# Patient Record
Sex: FEMALE
[De-identification: names, ages, dates, MRNs, and addresses within clinical notes are randomized per-mention and may not be internally consistent; named-entity substitution may affect disease eponyms.]

---

## 2017-08-12 ENCOUNTER — HOSPITAL ENCOUNTER (OUTPATIENT)
Dept: HOSPITAL 42 - ED | Age: 52
Setting detail: OBSERVATION
LOS: 1 days | Discharge: HOME | End: 2017-08-13
Attending: INTERNAL MEDICINE | Admitting: INTERNAL MEDICINE
Payer: COMMERCIAL

## 2017-08-12 VITALS — BODY MASS INDEX: 35.5 KG/M2

## 2017-08-12 DIAGNOSIS — E78.5: ICD-10-CM

## 2017-08-12 DIAGNOSIS — Z91.11: ICD-10-CM

## 2017-08-12 DIAGNOSIS — D72.829: ICD-10-CM

## 2017-08-12 DIAGNOSIS — E78.2: ICD-10-CM

## 2017-08-12 DIAGNOSIS — R31.29: ICD-10-CM

## 2017-08-12 DIAGNOSIS — I10: ICD-10-CM

## 2017-08-12 DIAGNOSIS — R07.89: Primary | ICD-10-CM

## 2017-08-12 DIAGNOSIS — Z79.4: ICD-10-CM

## 2017-08-12 DIAGNOSIS — E11.9: ICD-10-CM

## 2017-08-12 DIAGNOSIS — E66.01: ICD-10-CM

## 2017-08-12 PROCEDURE — 83615 LACTATE (LD) (LDH) ENZYME: CPT

## 2017-08-12 PROCEDURE — 85027 COMPLETE CBC AUTOMATED: CPT

## 2017-08-12 PROCEDURE — 82728 ASSAY OF FERRITIN: CPT

## 2017-08-12 PROCEDURE — 80053 COMPREHEN METABOLIC PANEL: CPT

## 2017-08-12 PROCEDURE — 82550 ASSAY OF CK (CPK): CPT

## 2017-08-12 PROCEDURE — 83540 ASSAY OF IRON: CPT

## 2017-08-12 PROCEDURE — 84484 ASSAY OF TROPONIN QUANT: CPT

## 2017-08-12 PROCEDURE — 82607 VITAMIN B-12: CPT

## 2017-08-12 PROCEDURE — 81001 URINALYSIS AUTO W/SCOPE: CPT

## 2017-08-12 PROCEDURE — 93005 ELECTROCARDIOGRAM TRACING: CPT

## 2017-08-12 PROCEDURE — 82746 ASSAY OF FOLIC ACID SERUM: CPT

## 2017-08-12 PROCEDURE — 82948 REAGENT STRIP/BLOOD GLUCOSE: CPT

## 2017-08-12 PROCEDURE — 71010: CPT

## 2017-08-12 PROCEDURE — 84439 ASSAY OF FREE THYROXINE: CPT

## 2017-08-12 PROCEDURE — 84443 ASSAY THYROID STIM HORMONE: CPT

## 2017-08-12 PROCEDURE — 99285 EMERGENCY DEPT VISIT HI MDM: CPT

## 2017-08-12 PROCEDURE — 80061 LIPID PANEL: CPT

## 2017-08-12 PROCEDURE — 83036 HEMOGLOBIN GLYCOSYLATED A1C: CPT

## 2017-08-12 PROCEDURE — 83550 IRON BINDING TEST: CPT

## 2017-08-12 PROCEDURE — 93970 EXTREMITY STUDY: CPT

## 2017-08-12 NOTE — ED PDOC
Arrival/HPI





<JohannDav - Last Filed: 08/13/17 01:56>





- History of Present Illness


Time/Duration: 1 hour


Symptom Onset: Sudden


Symptom Course: Improving


Context: Sitting





<KandyJennifer - Last Filed: 08/13/17 02:06>





- General


Chief Complaint: Anxiety


Time Seen by Provider: 08/12/17 22:35





- History of Present Illness


Narrative History of Present Illness (Text): 





08/12/17 23:19


50 y/o F with PMHx of HTN, HLD presents for chest pressure that began about 1.5 

hrs prior to coming to ED. Patient states that she was sitting at home when all 

of a sudden she developed a "sensation" in her epigastric region radiating to 

her chest.  Patient now c/o of slight chest pressure. Denies having any F/C, N/V

/D/C, abd pain, cough.  Patient also states that she occasionally has LE 

swelling L>R.  Patient called EMS and was told to take 4 baby Aspirins. No 

recent travels or history of blood clots.  Patient denies having any tobacco, 

ETOH or drug use.  Patient's father had CAD.    (Jennifer Gaitan)





Past Medical History





- Provider Review


Nursing Documentation Reviewed: Yes





- Travel History


Have you recently traveled outside [place] w/in the past 3 mons?: No





- Cardiac


Hx Hypertension: Yes





- Musculoskeletal/Rheumatological


Hx Falls: No





- Psychiatric


Hx Depression: No


Hx Emotional Abuse: No


Hx Physical Abuse: No


Hx Substance Use: No





- Past Surgical History


Past Surgical History: No Previous





- Suicidal Assessment


Feels Threatened In Home Enviroment: No





<Jennifer Gaitan - Last Filed: 08/13/17 02:06>





Family/Social History





- Physician Review


Nursing Documentation Reviewed: Yes


Family/Social History: CAD/MI


Smoking Status: Never Smoked


Hx Alcohol Use: No


Hx Substance Use: No





<Jennifer Gaitan - Last Filed: 08/13/17 02:06>





Allergies/Home Meds





<JohannDav - Last Filed: 08/13/17 01:56>





<Jennifer Gaitan - Last Filed: 08/13/17 02:06>


Allergies/Adverse Reactions: 


Allergies





No Known Allergies Allergy (Verified 06/09/13 20:34)


 








Home Medications: 


 Home Meds











 Medication  Instructions  Recorded  Confirmed


 


Losartan/Hydrochlorothiazide 1 tab PO DAILY 04/22/16 04/22/16





[Hyzaar 100-25 Tablet]   














Review of Systems





- Review of Systems


Constitutional: Normal.  absent: Fatigue, Fevers


Eyes: Normal.  absent: Photophobia


ENT: Normal.  absent: Sore Throat, Rhinorrhea


Respiratory: Normal.  absent: SOB, Cough, Sputum, Wheezing


Cardiovascular: Normal, Chest Pain.  absent: Palpitations, Edema, Calf Pain


Gastrointestinal: Normal.  absent: Abdominal Pain, Constipation, Diarrhea, 

Nausea, Vomiting


Genitourinary Female: Normal.  absent: Dysuria, Frequency


Musculoskeletal: absent: Back Pain


Skin: Normal.  absent: Rash, Pruritis, Skin Lesions


Neurological: Normal.  absent: Headache, Dizziness


Endocrine: Normal.  absent: Diaphoresis


Hemo/Lymphatic: Normal.  absent: Adenopathy, Easy Bleeding





<Jennifer Gaitan - Last Filed: 08/13/17 02:06>





Physical Exam


Temperature: Afebrile


Blood Pressure: Hypertensive


Pulse: Regular


Respiratory Rate: Normal


Appearance: Positive for: Well-Appearing, Non-Toxic, Comfortable


Pain Distress: None


Mental Status: Positive for: Alert and Oriented X 3





- Systems Exam


Head: Present: Atraumatic, Normocephalic


Extroacular Muscles: Present: EOMI


Mouth: Present: Moist Mucous Membranes


Respiratory/Chest: Present: Clear to Auscultation, Good Air Exchange.  No: 

Respiratory Distress, Accessory Muscle Use, Wheezes, Rales, Rhonchi


Cardiovascular: Present: Regular Rate and Rhythm, Normal S1, S2.  No: Murmurs, 

Rub, Gallop, Muffled


Abdomen: Present: Normal Bowel Sounds.  No: Tenderness, Distention, Peritoneal 

Signs, Rebound, Guarding


Upper Extremity: No: Cyanosis, Edema


Lower Extremity: Present: Normal Inspection, NORMAL PULSES.  No: Edema, CALF 

TENDERNESS, Sheeba's Sign


Neurological: Present: GCS=15


Skin: Present: Warm, Dry, Normal Color.  No: Rashes, Pale


Psychiatric: Present: Alert, Oriented x 3, Normal Insight, Normal Concentration





<Jennifer Gaitan - Last Filed: 08/13/17 02:06>


Vital Signs











  Temp Pulse Resp BP Pulse Ox


 


 08/13/17 01:56   78  16  133/76  99


 


 08/12/17 22:31  98.9 F  75  16  150/96 H  96














Medical Decision Making





- Lab Interpretations


I have reviewed the lab results: Yes





- RAD Interpretation


: ED Physician, Radiologist





- EKG Interpretation


Interpreted by ED Physician: Yes


Type: 12 lead EKG





<Dav Wills - Last Filed: 08/13/17 01:56>





- Lab Interpretations


I have reviewed the lab results: Yes





- RAD Interpretation


: ED Physician, Radiologist





- EKG Interpretation


Interpreted by ED Physician: Yes


Type: 12 lead EKG





<Jennifer Gaitan - Last Filed: 08/13/17 02:06>


ED Course and Treatment: 


Impression:


Pt seen and evaluated with medical resident. Pt, whose past medical history 

includes hypertension and hyperlipidemia, complaining of chest pressure. Aware 

and agree with HPI, clinical findings, plan, and management.





Plan:


-- EKG


-- Chest X-ray


-- US Duplex Lower Extremities


-- Labs, cardiac enzymes


-- Reassess and disposition





08/13/17 01:54


Case discussed with Dr. Zaman, who is aware and agrees with plan. Accepts pt in 

to his service. Pt will go to Telemetry for chest pain. Requests Dr. Mccann on 

consult.


 (Dav Wills)





08/12/17 23:26


50 y/o F presents for chest pressure.  Patient took stat dose fo Aspirin prior 

to coming ot hospital.





Will check CBC, CMP, cardiac enzymes, EKG, CXR, LE US





08/13/17 01:54


Dr. Wills spoke with Dr. Zaman who accepts patient under his service.  

Resident is notified.   (Jennifer Gaitan)





- Lab Interpretations


Narrative Lab Interpretation (Text): 





08/13/17 01:46


noted to have WBC count of 12.3 


08/13/17 02:06


troponin is negative  (Jennifer Gaitan)


Lab Results: 








 08/13/17 01:05 





 08/13/17 01:05 





 Lab Results





08/13/17 01:50: Urine Color Yellow, Urine Appearance Slight-cloudy, Urine pH 6.0

, Ur Specific Gravity 1.025, Urine Protein Negative, Urine Glucose (UA) Negative

, Urine Ketones Negative, Urine Blood Small H, Urine Nitrate Negative, Urine 

Bilirubin Negative, Urine Urobilinogen 0.2, Ur Leukocyte Esterase Negative, 

Urine RBC Pending, Urine WBC Pending


08/13/17 01:05: Sodium 143, Potassium 4.2, Chloride 102, Carbon Dioxide 28, 

Anion Gap 17, BUN 17, Creatinine 0.6, Est GFR (African Amer) > 60, Est GFR (Non-

Af Amer) > 60, Random Glucose 128 H, Calcium 10.0, Total Bilirubin 0.4, AST 27, 

ALT 30, Alkaline Phosphatase 78, Lactate Dehydrogenase 331 L, Total Creatine 

Kinase 54, Troponin I < 0.01, Total Protein 7.4, Albumin 4.3, Globulin 3.2, 

Albumin/Globulin Ratio 1.3


08/13/17 01:05: WBC 12.3 H D, RBC 4.69, Hgb 13.9, Hct 41.0, MCV 87.4, MCH 29.6, 

MCHC 33.9, RDW 13.4, Plt Count 282, MPV 9.9











- RAD Interpretation


Narrative RAD Interpretations (Text): 





08/13/17 01:03


CXr was negative.  LE Us was negative.   (Jennifer Gaitan)


Radiology Orders: 








08/12/17 23:17


DUPLEX LOWER EXTRM VEIN BILAT [US] Stat 





08/12/17 23:19


CHEST PORTABLE [RAD] Stat 














- EKG Interpretation


EKG Interpretation (Text): 





08/12/17 23:25


NSR HR of 70, no ST changes, normal axis and normal interval.  No change 

compared to EKG from 2016 (Jennifer Gaitan)





- PA / NP / Resident Statement


JUAN CARLOS has reviewed & agrees with the documentation as recorded.


JUAN CARLOS has examined the patient and agrees with the treatment plan.





<Dav Wills - Last Filed: 08/13/17 01:56>





Disposition/Present on Arrival





<Dav Wills - Last Filed: 08/13/17 01:56>





- Present on Arrival


Any Indicators Present on Arrival: No


History of DVT/PE: No


History of Uncontrolled Diabetes: No


Urinary Catheter: No


History of Decub. Ulcer: No


History Surgical Site Infection Following: None





- Disposition


Have Diagnosis and Disposition been Completed?: Yes


Disposition Time: 02:06


Patient Plan: Admission, Observation





<Jennifer Gaitan - Last Filed: 08/13/17 02:06>





- Disposition


Diagnosis: 


 Chest pain





Disposition: HOSPITALIZED


Condition: STABLE


Discharge Instructions (ExitCare):  Chest Pain (ED)


Referrals: 


Carla Moraes MD [Primary Care Provider] - Follow up with primary


Forms:  [facility] (English)

## 2017-08-13 VITALS
TEMPERATURE: 98.6 F | DIASTOLIC BLOOD PRESSURE: 82 MMHG | OXYGEN SATURATION: 98 % | HEART RATE: 68 BPM | SYSTOLIC BLOOD PRESSURE: 119 MMHG

## 2017-08-13 VITALS — RESPIRATION RATE: 20 BRPM

## 2017-08-13 LAB
% IRON SATURATION: 13 % (ref 20–55)
ALBUMIN SERPL-MCNC: 4.3 G/DL (ref 3–4.8)
ALBUMIN/GLOB SERPL: 1.3 {RATIO} (ref 1.1–1.8)
ALT SERPL-CCNC: 30 U/L (ref 7–56)
APPEARANCE UR: (no result)
AST SERPL-CCNC: 27 U/L (ref 15–39)
BILIRUB UR-MCNC: NEGATIVE MG/DL
BUN SERPL-MCNC: 17 MG/DL (ref 7–21)
CALCIUM SERPL-MCNC: 10 MG/DL (ref 8.4–10.5)
COLOR UR: YELLOW
EPI CELLS #/AREA URNS HPF: (no result) /HPF (ref 0–5)
ERYTHROCYTE [DISTWIDTH] IN BLOOD BY AUTOMATED COUNT: 13.4 % (ref 11.5–14.5)
FERRITIN SERPL-MCNC: 15.3 NG/ML
FOLATE SERPL-MCNC: 13.4 NG/ML
GFR NON-AFRICAN AMERICAN: > 60
GLUCOSE UR STRIP-MCNC: NEGATIVE MG/DL
HDLC SERPL-MCNC: 32 MG/DL (ref 29–60)
HGB BLD-MCNC: 13.9 G/DL (ref 12–16)
IRON SERPL-MCNC: 54 UG/DL (ref 45–180)
LDLC SERPL-MCNC: 128 MG/DL (ref 0–129)
LEUKOCYTE ESTERASE UR-ACNC: NEGATIVE LEU/UL
MCH RBC QN AUTO: 29.6 PG (ref 25–35)
MCHC RBC AUTO-ENTMCNC: 33.9 G/DL (ref 31–37)
MCV RBC AUTO: 87.4 FL (ref 80–105)
PH UR STRIP: 6 [PH] (ref 4.7–8)
PLATELET # BLD: 282 10^3/UL (ref 120–450)
PMV BLD AUTO: 9.9 FL (ref 7–11)
PROT UR STRIP-MCNC: NEGATIVE MG/DL
RBC # BLD AUTO: 4.69 10^6/UL (ref 3.5–6.1)
RBC # UR STRIP: (no result) /UL
SP GR UR STRIP: 1.02 (ref 1–1.03)
T4 FREE SERPL-MCNC: 1.04 NG/DL (ref 0.78–2.19)
TIBC SERPL-MCNC: 417 UG/DL (ref 265–497)
TRANSFERRIN SERPL-MCNC: 347.31 MG/DL (ref 206–381)
TROPONIN I SERPL-MCNC: < 0.01 NG/ML
TROPONIN I SERPL-MCNC: < 0.01 NG/ML
URINE NITRATE: NEGATIVE
UROBILINOGEN UR STRIP-ACNC: 0.2 E.U./DL
VIT B12 SERPL-MCNC: 357 PG/ML (ref 239–931)
WBC # BLD AUTO: 12.3 10^3/UL (ref 4.5–11)
WBC #/AREA URNS HPF: (no result) /HPF (ref 0–6)

## 2017-08-13 NOTE — RAD
HISTORY:

Chest pressure.  Technique: Portable study performed @ 00:31.



COMPARISON:

03/06/2016. 



FINDINGS:



LUNGS:

No active pulmonary disease.



PLEURA:

No significant pleural effusion identified, no pneumothorax apparent.



CARDIOVASCULAR:

 No radiographic findings to suggest acute or significant 

cardiovascular disease.



OSSEOUS STRUCTURES:

No significant abnormalities.



VISUALIZED UPPER ABDOMEN:

Normal.



OTHER FINDINGS:

None.



IMPRESSION:

No active disease. No significant interval change compared to the 

prior examination(s).



_____________________________________________



No preliminary report provided by emergency department personnel.

## 2017-08-13 NOTE — CARD
--------------- APPROVED REPORT --------------





EKG Measurement

Heart [id number]

DC 194P44

[id number]

[id number]

[id number]



<Conclusion>

Normal sinus rhythm

Cannot rule out Inferior infarct, age undetermined

Abnormal ECG

## 2017-08-13 NOTE — CP.PCM.HP
History of Present Illness





- History of Present Illness


History of Present Illness: 





CC: I wasn't feeling right





HPI: 52 yo female PMHx HTN, HLD, DM2, anal fissure presents complaining of a 

"funny sensation" from her abdomen to her chest that occurred the evening prior 

to admission. Patient reports she was eating dinner [Chinese food and pizza] 

and started to feel a cold sensation in her stomach after her meal while she 

was lying in bed. She checked her blood pressure and it was 178/93mmHg. Patient 

reports she has been compliant with her medications. She reports she has had a 

similar presentation in the past but this time her sensation lasted for 15 

minutes after which she decided to call 911. Patient said that during the 

episode she started to feel anxious and was having some fluttering down her 

left arm. She denied any acute chest pain but admitted to palpitations at time 

of event. Patient denied fever, chills, headache, change in vision, change in 

hearing, sore throat, chest pain, SOB, cough, abd pain, nausea, vomiting, 

diarrhea, hematemesis, hematochezia, dysuria, urinary frequency, leg swelling, 

rash, easy bruising, easy bleeding, recent travel, sick contacts, change in 

weight. She admitted to dizziness, palpitations, constipation, leg pain, 

anxiety. Patient has also been complaining of blood after she wipes after a BM- 

she has a history of anal fissures and has been advised to do a colonoscopy but 

has not done so as of yet. 





PMD: Hammadi


PMHx: HTN, HLD, DM2, anal fissures


PSurgHx: denies


Family Hx: father  MI 61yo, mother lung ca  45 yo


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 

analyst


Meds: Losartan, Metoprolol, Simvastatin


Allergies: NKDA





ROS: Denies: fever, chills, headache, change in vision, change in hearing, sore 

throat, chest pain, SOB, cough, abd pain, nausea, vomiting, diarrhea, 

hematemesis, hematochezia, dysuria, urinary frequency, leg swelling, rash, easy 

bruising, easy bleeding, recent travel, sick contacts, change in weight  


Admits to: dizziness, palpitations, constipation, leg pain, anxiety





Present on Admission





- Present on Admission


Any Indicators Present on Admission: No





Review of Systems





- Constitutional


Constitutional: As Per HPI.  absent: Chills, Fever





- EENT


Eyes: As Per HPI.  absent: Blurred Vision


Ears: As Per HPI, Dizziness


Nose/Mouth/Throat: As Per HPI.  absent: Sore Throat





- Cardiovascular


Cardiovascular: As Per HPI, Palpitations.  absent: Chest Pain, Dyspnea on 

Exertion, Edema, Leg Edema





- Respiratory


Respiratory: As Per HPI.  absent: Cough, Dyspnea





- Gastrointestinal


Gastrointestinal: As Per HPI, Constipation, Hematochezia.  absent: Abdominal 

Pain, Nausea, Vomiting





- Genitourinary


Genitourinary: As Per HPI.  absent: Dysuria, Hematuria, Pyuria





- Musculoskeletal


Musculoskeletal: As Per HPI, Myalgias (legs bilaterally).  absent: Back Pain, 

Numbness, Tingling





- Integumentary


Integumentary: As Per HPI.  absent: Dry Skin





- Neurological


Neurological: As Per HPI, Dizziness.  absent: Headaches





- Psychiatric


Psychiatric: As Per HPI, Anxiety.  absent: Depression





- Endocrine


Endocrine: As Per HPI.  absent: Polydipsia, Polyphagia, Polyuria





- Hematologic/Lymphatic


Hematologic: As Per HPI.  absent: Easy Bleeding, Easy Bruising, Lymphadenopathy





Past Patient History





- Past Social History


Smoking Status: Never Smoked





- CARDIAC


Hx Hypertension: Yes





- MUSCULOSKELETAL/RHEUMATOLOGICAL


Hx Falls: No





- PSYCHIATRIC


Hx Depression: No


Hx Emotional Abuse: No


Hx Physical Abuse: No


Hx Substance Use: No





Meds


Allergies/Adverse Reactions: 


 Allergies











Allergy/AdvReac Type Severity Reaction Status Date / Time


 


No Known Allergies Allergy   Verified 13 20:34














Physical Exam





- Constitutional


Appears: Non-toxic, No Acute Distress





- Head Exam


Head Exam: ATRAUMATIC, NORMAL INSPECTION, NORMOCEPHALIC





- Eye Exam


Eye Exam: EOMI, Normal appearance, PERRL.  absent: Conjunctival injection, 

Scleral icterus


Pupil Exam: NORMAL ACCOMODATION





- ENT Exam


ENT Exam: Mucous Membranes Moist





- Neck Exam


Neck exam: Positive for: Full Rom, Normal Inspection





- Respiratory Exam


Respiratory Exam: Clear to Auscultation Bilateral, NORMAL BREATHING PATTERN.  

absent: Accessory Muscle Use, Rales, Rhonchi, Wheezes, Respiratory Distress





- Cardiovascular Exam


Cardiovascular Exam: REGULAR RHYTHM, RRR, +S1, +S2





- GI/Abdominal Exam


GI & Abdominal Exam: Normal Bowel Sounds, Soft.  absent: Firm, Guarding, Rigid, 

Tenderness





- Extremities Exam


Extremities exam: Positive for: normal capillary refill, normal inspection, 

pedal pulses present.  Negative for: pedal edema, tenderness





- Back Exam


Back exam: NORMAL INSPECTION.  absent: rash noted





- Neurological Exam


Neurological exam: Alert, Oriented x3





- Psychiatric Exam


Psychiatric exam: Normal Affect, Normal Mood





- Skin


Skin Exam: Dry, Intact, Normal Color, Warm





Results





- Vital Signs


Recent Vital Signs: 





 Last Vital Signs











Temp  98.9 F   17 22:31


 


Pulse  78   17 01:56


 


Resp  16   17 01:56


 


BP  133/76   17 01:56


 


Pulse Ox  99   17 01:56














- Labs


Result Diagrams: 


 17 01:05





 17 01:05





Assessment & Plan





- Assessment and Plan (Free Text)


Assessment: 





52 yo female PMHx HTN, HLD, DM2, anal fissure presents complaining of a "funny 

sensation" from her abdomen to her chest that occurred the evening prior to 

admission


Plan: 





Atypical chest pain


- r/o ACS


- Troponin: < 0.01


   f/u MITALI x 2 q6H


- EKG: sinus tachycardia at 102 bpm normal axis normal intervals no ST 

elevations


   f/u EKG x 2 q6H


- D-dimer: 0.45


- f/u Echo


- f/u fasting lipid panel


- f/u TSH and free T4


- f/u CXR official read


- ASA 81mg po daily


- Lipitor 40mg po qhs


- NS @ 100cc/hr





Leukocytosis


- on admission: 12.3


- f/u blood culture and urine culture


- Monitor as patient is afebrile and asymptomatic





B/l LE pain


- f/u LE u/s b/l


- hold SCDs until DVT ruled out





Hx of hypertension


- HCTZ 25mg po daily


- Cozaar 100mg po daily


- Lopressor 25mg po bid





Hx of DM2


- f/u HgbA1c


- RISS low dose


- Accucheck ACHS


- Heart healthy moderate consistent carb diet


- NS @ 100cc/hr


- Check sugars and adjust medications accordingly





Hx of HLD


- f/u fasting lipid panel


- Lipitor 40mg po hs





Hx of anal fissures


- patient reports constipation and blood after wiping after BM


- Colace 100mg po daily





Hx of Anxiety


- Patient does not take any medications at home


- Monitor





GI ppx: Protonix 40mg po daily


DVT ppx: Lovenox 40mg sc daily


Diet: Heart healthy moderate consistent carb diet





Will discuss case with Dr. Austyn Lanier PGY2

## 2017-08-13 NOTE — CON
DATE:  08/13/2017



CARDIOLOGY CONSULTATION



HISTORY OF PRESENT ILLNESS:  The patient is a 51-year-old woman who

presents with epigastric discomfort after a large fatty meals.  She also

complains of occasional palpitations.



The patient's past medical history has had multiple episodes of similar

symptoms.  Cardiac workup includes two sequential stress test which were

unremarkable.  The patient suffers from morbid obesity, hypertension and

hypercholesterolemia.



The patient is noncompliant current dietary and cardiac risk fever risk

reduction instructions.



SOCIAL HISTORY:  She denies smoking.



REVIEW OF SYSTEMS:  A 14-point review of systems was reviewed.  No cardiac

symptomatology is noted.  The patient is asking the go home.



PHYSICAL EXAMINATION

VITAL SIGNS:  Blood pressure is 140/84, the heart rate is in the 70s and

normal sinus rhythm.

NECK:  Negative JVD.

LUNGS:  Without rales.

HEART:  Reveals S1 and S2.

EXTREMITIES:  Without edema.



LABORATORY DATA:  Includes a glucose of 128.  Troponin is negative. 

Hemoglobin is 13.9.



IMPRESSION

1.  Peptic ulcer symptoms after a large fatty meal.

2.  Palpitations.

3.  Atypical chest pain.

4.  Hypertension.

5.  Hypercholesterolemia.

6.  Morbid obesity.



PLAN:  Given these findings, I have discussed her lifestyle with the

patient in detail.  The patient seems to understand.  We will DC telemetry

today.  We will arrange for an outpatient stress test, especially given her

cardiac risk factors.





__________________________________________

Weston Mccann MD





DD:  08/13/2017 9:08:20

DT:  08/13/2017 9:11:53

Job # [id number]

## 2017-08-13 NOTE — CARD
--------------- APPROVED REPORT --------------





EKG Measurement

Heart [id number]

NC 192P40

[id number]

[id number]

[id number]



<Conclusion>

Normal sinus rhythm

Inferior infarct, age undetermined

Abnormal ECG

## 2017-08-13 NOTE — US
HISTORY:

Leg pain and swelling. Evaluate for DVT



PHYSICIAN(S):  Weston Leigh MD.



TECHNIQUE:

Duplex sonography and color-flow Doppler with graded compression were 

used to evaluate the deep venous systems of both lower extremities. 



FINDINGS:

The visualized deep venous systems of both lower extremities are 

sonographically normal and compressible. Normal wave forms and 

augmentation are seen. There is no sonographic evidence for deep 

venous thrombosis in the visualized segments of both lower 

extremities.



IMPRESSION:

No sonographic evidence for deep venous thrombosis in the visualized 

segments of both lower extremities.

## 2017-08-14 NOTE — DS
FINAL PROGRESS NOTE AND DISCHARGE SUMMARY



DATE:  08/13/2017



LOCATION:  The patient was seen in room 269, bed 1.



HISTORY OF PRESENT ILLNESS:  The patient is comfortable.  The patient's

symptoms have resolved for which the patient presented to the emergency

room.  The patient is seen lying in the bed.  The patient is comfortable

without any complaints of chest pain, shortness of breath.  The patient was

seen lying in the bed.  The patient denies any chest pain.  Denies any

shortness of breath.  Denies any palpitations.  Denies any burning

sensation.  Denies any chest pressure.  Denies any heaviness.



PHYSICAL EXAMINATION:

VITAL SIGNS:  The patient is afebrile, T-max 98.9, telemetry shows sinus

rhythm, blood pressure 148/95, 140/84, 147/84, respirations 20, and O2

saturation 97%.

HEAD:  Normocephalic and atraumatic.

HEENT:  Shows pink conjunctivae.  No oropharyngeal lesion.

NECK:  No neck rigidity.

CHEST:  Kyphosis.

LUNGS:  Shows no rales, crackles, or wheezing.

CARDIOVASCULAR:  S1 and S2, regular rhythm.

ABDOMEN:  Obese.  Positive bowel sounds.

GENITALIA:  Female.

RECTAL:  Deferred.

EXTREMITIES:  Shows no pitting edema.  No calf tenderness.  No Homans'

sign.

NEUROLOGIC:  The patient is alert, awake and oriented x 3.  Examination

without any deficits.  Cranial nerves II through XII intact.

MUSCULOSKELETAL:  Shows body mass index of 37.



LABORATORY DATA:  Cardiac enzymes are negative.  EKG reviewed shows sinus

rhythm.  Chest x-ray was negative for any active disease.  EKG is reviewed.

The patient was seen by Dr. Weston Mccann today.  Telemetry was discontinued

by Dr. Weston Mccann.



IMPRESSION AND PLAN:

1.  Possibly atypical chest pain.

2.  Hypertension.

3.  Morbid obesity.

4.  Hyperlipidemia.

5.  Type 2 diabetes mellitus.

6.  Leukocytosis.

7.  Hypertriglyceridemia and hypercholesterolemia with elevated LDL.

8.  Microscopic hematuria.

9.  History of dyslipidemia.



PLAN:  At this time, the patient is clear for discharge by cardiology.



DISCHARGE MEDICATIONS:  The patient was advised to resume all medications

at home including aspirin 325 mg p.o daily, Hyzaar 100/25 daily, Lipitor 40

mg daily, Lopressor 25 mg twice a day.  The patient is discharged home with

discharge followup with Dr. Zaman within 1 week and Dr. Mccann within 1 week.

The patient was advised to resume all medications.  As dictated above, the

patient was also advised.  As in the past, the patient had a stress test

done in 04/2016, which was negative.  With normal ejection fraction.



The patient's time spent in the entire discharge process more than 45

minutes.



The patient was explained about the details at length.



Dictated and electronically signed, not read.



Signing off, Iron Zaman.



__________________________________________

Iron Zaman MD



DD:  08/13/2017 12:20:43

DT:  08/14/2017 4:13:09

Job # [id number]

## 2017-10-05 ENCOUNTER — HOSPITAL ENCOUNTER (EMERGENCY)
Dept: HOSPITAL 42 - ED | Age: 52
Discharge: HOME | End: 2017-10-05
Payer: COMMERCIAL

## 2017-10-05 VITALS — RESPIRATION RATE: 16 BRPM | SYSTOLIC BLOOD PRESSURE: 121 MMHG | HEART RATE: 63 BPM | DIASTOLIC BLOOD PRESSURE: 69 MMHG

## 2017-10-05 VITALS — BODY MASS INDEX: 35.5 KG/M2

## 2017-10-05 VITALS — TEMPERATURE: 98.7 F | OXYGEN SATURATION: 100 %

## 2017-10-05 DIAGNOSIS — I10: ICD-10-CM

## 2017-10-05 DIAGNOSIS — R00.2: Primary | ICD-10-CM

## 2017-10-05 DIAGNOSIS — E78.5: ICD-10-CM

## 2017-10-05 LAB
ALBUMIN/GLOB SERPL: 1.3 {RATIO} (ref 1.1–1.8)
ALP SERPL-CCNC: 64 U/L (ref 38–126)
ALT SERPL-CCNC: 32 U/L (ref 7–56)
APTT BLD: 30.1 SECONDS (ref 23.7–30.8)
AST SERPL-CCNC: 23 U/L (ref 14–36)
BASOPHILS # BLD AUTO: 0.03 K/MM3 (ref 0–2)
BASOPHILS NFR BLD: 0.4 % (ref 0–3)
BILIRUB SERPL-MCNC: 0.5 MG/DL (ref 0.2–1.3)
BUN SERPL-MCNC: 19 MG/DL (ref 7–21)
CALCIUM SERPL-MCNC: 9.6 MG/DL (ref 8.4–10.5)
CHLORIDE SERPL-SCNC: 105 MMOL/L (ref 98–107)
CO2 SERPL-SCNC: 24 MMOL/L (ref 21–33)
EOSINOPHIL # BLD: 0.1 10*3/UL (ref 0–0.7)
EOSINOPHIL NFR BLD: 1.7 % (ref 1.5–5)
ERYTHROCYTE [DISTWIDTH] IN BLOOD BY AUTOMATED COUNT: 13.4 % (ref 11.5–14.5)
GLOBULIN SER-MCNC: 3 GM/DL
GLUCOSE SERPL-MCNC: 121 MG/DL (ref 70–110)
GRANULOCYTES # BLD: 5.3 10*3/UL (ref 1.4–6.5)
GRANULOCYTES NFR BLD: 70.2 % (ref 50–68)
HCT VFR BLD CALC: 39.9 % (ref 36–48)
INR PPP: 0.99 (ref 0.93–1.08)
LYMPHOCYTES # BLD: 1.6 10*3/UL (ref 1.2–3.4)
LYMPHOCYTES NFR BLD AUTO: 20.6 % (ref 22–35)
MCH RBC QN AUTO: 30.2 PG (ref 25–35)
MCHC RBC AUTO-ENTMCNC: 35.1 G/DL (ref 31–37)
MCV RBC AUTO: 86.2 FL (ref 80–105)
MONOCYTES # BLD AUTO: 0.5 10*3/UL (ref 0.1–0.6)
MONOCYTES NFR BLD: 7.1 % (ref 1–6)
PLATELET # BLD: 251 10^3/UL (ref 120–450)
PMV BLD AUTO: 10.1 FL (ref 7–11)
POTASSIUM SERPL-SCNC: 3.6 MMOL/L (ref 3.6–5)
PROT SERPL-MCNC: 7 G/DL (ref 5.8–8.3)
SODIUM SERPL-SCNC: 141 MMOL/L (ref 132–148)
T4 FREE SERPL-MCNC: 0.84 NG/DL (ref 0.78–2.19)
TROPONIN I SERPL-MCNC: < 0.01 NG/ML
TSH SERPL-ACNC: 1.01 MIU/ML (ref 0.46–4.68)
WBC # BLD AUTO: 7.6 10^3/UL (ref 4.5–11)

## 2017-10-05 PROCEDURE — 96360 HYDRATION IV INFUSION INIT: CPT

## 2017-10-05 PROCEDURE — 99284 EMERGENCY DEPT VISIT MOD MDM: CPT

## 2017-10-05 PROCEDURE — 84484 ASSAY OF TROPONIN QUANT: CPT

## 2017-10-05 PROCEDURE — 83615 LACTATE (LD) (LDH) ENZYME: CPT

## 2017-10-05 PROCEDURE — 85025 COMPLETE CBC W/AUTO DIFF WBC: CPT

## 2017-10-05 PROCEDURE — 82948 REAGENT STRIP/BLOOD GLUCOSE: CPT

## 2017-10-05 PROCEDURE — 96361 HYDRATE IV INFUSION ADD-ON: CPT

## 2017-10-05 PROCEDURE — 71010: CPT

## 2017-10-05 PROCEDURE — 80053 COMPREHEN METABOLIC PANEL: CPT

## 2017-10-05 PROCEDURE — 84443 ASSAY THYROID STIM HORMONE: CPT

## 2017-10-05 PROCEDURE — 82550 ASSAY OF CK (CPK): CPT

## 2017-10-05 PROCEDURE — 85610 PROTHROMBIN TIME: CPT

## 2017-10-05 PROCEDURE — 85730 THROMBOPLASTIN TIME PARTIAL: CPT

## 2017-10-05 PROCEDURE — 93970 EXTREMITY STUDY: CPT

## 2017-10-05 PROCEDURE — 84439 ASSAY OF FREE THYROXINE: CPT

## 2017-10-05 PROCEDURE — 93005 ELECTROCARDIOGRAM TRACING: CPT

## 2017-10-05 NOTE — RAD
PROCEDURE:  CHEST RADIOGRAPH, 1 VIEW



HISTORY:

palpitations



COMPARISON:

08/13/2017.



FINDINGS:



LUNGS:

The lungs are clear.



PLEURA:

There is a small left pleural effusion No pneumothorax or right 

pleural effusion seen.



CARDIOVASCULAR:

Normal.



OSSEOUS STRUCTURES:

No significant abnormalities.



VISUALIZED UPPER ABDOMEN:

Normal.



OTHER FINDINGS:

None. 



IMPRESSION:

Small left pleural effusion.

## 2017-10-05 NOTE — ED PDOC
Arrival/HPI





- General


Chief Complaint: Anxiety


Time Seen by Provider: 10/05/17 08:08





- History of Present Illness


Narrative History of Present Illness (Text): 





10/05/17 08:10


52yo female with an episode of dizziness, feeling like the room was spinning, 

feeling weak, and having palpitations. States this spontaneously resolved, 

except for weakness. Pt reports she had no chest pain, no SOB, no SANCHEZ. States 

she has had these episodes in the past. Had a normal stress test last year, 

states she had 2 negative CTs of her head. States she has had chronic LLE 

swelling, no trauma or injury. No nausea or vomiting, no abdominal or back 

pains. No other complaints. 





Past Medical History





- Provider Review


Nursing Documentation Reviewed: Yes





- Cardiac


Hx Cardiac Disorders: Yes (hyperlipidemia)


Hx Hypertension: Yes


Hx Peripheral Edema: Yes (bilateral leg edema +1)





- Pulmonary


Hx Respiratory Disorders: No





- Neurological


Hx Neurological Disorder: No





- HEENT


Hx HEENT Disorder: No





- Renal


Hx Renal Disorder: No





- Endocrine/Metabolic


Hx Endocrine Disorders: No





- Hematological/Oncological


Hx Blood Disorders: No





- Integumentary


Hx Dermatological Disorder: No





- Musculoskeletal/Rheumatological


Hx Musculoskeletal Disorders: No


Hx Arthritis:  (joint pains)


Hx Falls: No





- Gastrointestinal


Hx Gastrointestinal Disorders: No





- Genitourinary/Gynecological


Hx Genitourinary Disorders: No





- Psychiatric


Hx Psychophysiologic Disorder: Yes


Hx Anxiety: Yes


Hx Depression: Yes


Hx Substance Use: No





- Past Surgical History


Past Surgical History: No Previous





- Suicidal Assessment


Feels Threatened In Home Enviroment: No





Family/Social History





- Physician Review


Nursing Documentation Reviewed: Yes


Family/Social History: Unknown Family HX


Smoking Status: Never Smoked


Hx Alcohol Use: No


Hx Substance Use: No





Allergies/Home Meds


Allergies/Adverse Reactions: 


Allergies





No Known Allergies Allergy (Verified 10/05/17 07:49)


 








Home Medications: 


 Home Meds











 Medication  Instructions  Recorded  Confirmed


 


Losartan/Hydrochlorothiazide 1 tab PO DAILY 04/22/16 10/05/17





[Hyzaar 100-25 Tablet]   


 


Aspirin [Ecotrin] 81 mg PO DAILY 10/05/17 10/05/17


 


Atorvastatin [Lipitor] 20 mg PO .EVERY OTHER DAY 10/05/17 10/05/17














Physical Exam





- Physical Exam


Narrative Physical Exam (Text): 





10/05/17 08:13





- Review of Systems





Constitutional: weakness.  absent: Weight Change, Fevers


Eyes: Normal


ENT: denies sore throat, denies tristhmus


Respiratory: Normal.  absent: SOB, Cough, Sputum


Cardiovascular: palpitations. absent: Chest Pain, Syncope 


Gastrointestinal: Normal.  absent: Abdominal Pain, Diarrhea, Nausea, Vomiting


Genitourinary: Normal.  absent: Dysuria, Frequency, Hematuria, vaginal bleeding


Musculoskeletal: Normal.  absent: Arthralgias, Back Pain, Neck Pain


Skin: no rashes, no erythema


Neurological: absent: Focal Weakness


Endocrine: Normal


Hemo/Lymphatic: Normal


Psychiatric: No suicidal or homicidal ideations





Physical exam





Patient appears age appropriate in no distress, speaking full sentences without 

difficulty





- Systems Exam


Head: Present: Atraumatic, Normocephalic


Pupils: Present: PERRL


Extroacular Muscles: Present: EOMI


Conjunctiva: Present: Normal


Mouth: Present: Moist Mucous Membranes


Neck: Present: Normal Range of Motion.  No: MIDLINE TENDERNESS, Paraspinal 

Tenderness


Respiratory/Chest: Present: Clear to Auscultation, Good Air Exchange.  No: 

Respiratory Distress, Accessory Muscle Use, Tachypneic


Cardiovascular: Present: Regular Rate and Rhythm, Normal S1, S2, Peripheal 

Pulses Present.  No: Murmurs


Abdomen: Present: Normal Bowel Sounds.  No: Tenderness, Distention, Peritoneal 

Signs, Rebound, Guarding


Back: Present: Normal Inspection.  No: Midline Tenderness, Paraspinal Tenderness


Upper Extremity: Present: Normal Inspection.  No: Cyanosis, Edema


Lower Extremity: Present: Normal Inspection.  No: Edema


Neurological: Present: GCS=15, Speech Normal, cranial nerves II through XII 

fully intact with no cerebellar abnormality, neurosensory fully intact. No 

focal neurological deficits.


Skin: Present: Warm, Dry, Normal Color.  No: Rashes


Lymphatic: Present: OX3, NI, NC


Psychiatric: Present: Alert, Oriented x 3, Normal Insight, Normal Concentration


Vital Signs Reviewed: Yes


Vital Signs











  Temp Pulse Resp BP Pulse Ox


 


 10/05/17 10:06   63  16  121/69  100


 


 10/05/17 07:56  98.7 F  68  15  124/89  100











Temperature: Afebrile


Blood Pressure: Normal


Pulse: Regular


Respiratory Rate: Normal


Appearance: Positive for: Well-Appearing


Pain Distress: None


Mental Status: Positive for: Alert and Oriented X 3


Finger Stick Blood Glucose: 142





Medical Decision Making


ED Course and Treatment: 





Progress Notes:


 


52yo female with weakness, palpitations, and dizziness episode. Pt states this 

has happened in the past. No acute findings on PE and no focal neurological 

deficits. 


VSS and accucheck in the 140s. 





Previous records reviewed, patient was discharged on 8/13/17. Diagnosis was 

atypical chest pain, hypertension, dyslipidemia, type 2 diabetes, leukocytosis. 

Patient has been seen by cardiology, and her stress test was documented from 4/ 16 which was negative with normal ejection fraction.





10/05/17 09:11


Case discussed with radiology technician, results are negative for DVT 

bilaterally. 





10/05/17 10:16


pt in no distress and denies complaints


I recommended admission into the hospital for further w/u, but pt states she 

would like to be dc'd home


dw dr. Zaman in detail, states pt can f/u with him today


pt states she feels comfortable being dc'd home with outpatient f/u





Pt states she understands to return to the ER right away for new or worsening 

symptoms or for inability to f/u with PMD or specialist as instructed. 





Patient states that she fully agrees with and understands discharge 

instructions. States that she agrees with the plan and disposition. Verbalized 

and repeated discharge instructions and plan. I have given the patient 

opportunity to ask any additional questions. 





- Lab Interpretations


Lab Results: 








 10/05/17 08:20 





 10/05/17 08:20 





 Lab Results





10/05/17 08:20: Free T4 0.84, TSH 3rd Generation 1.01


10/05/17 08:20: Sodium 141, Potassium 3.6, Chloride 105, Carbon Dioxide 24, 

Anion Gap 16, BUN 19, Creatinine 0.5 L, Est GFR ( Amer) > 60, Est GFR (

Non-Af Amer) > 60, Random Glucose 121 H, Calcium 9.6, Total Bilirubin 0.5, AST 

23, ALT 32, Alkaline Phosphatase 64, Lactate Dehydrogenase 368, Total Creatine 

Kinase 42, Troponin I < 0.01, Total Protein 7.0, Albumin 3.9, Globulin 3.0, 

Albumin/Globulin Ratio 1.3


10/05/17 08:20: PT 10.7, INR 0.99, APTT 30.1


10/05/17 08:20: WBC 7.6  D, RBC 4.63, Hgb 14.0, Hct 39.9, MCV 86.2, MCH 30.2, 

MCHC 35.1, RDW 13.4, Plt Count 251, MPV 10.1, Gran % 70.2 H, Lymph % (Auto) 

20.6 L, Mono % (Auto) 7.1 H, Eos % (Auto) 1.7, Baso % (Auto) 0.4, Gran # 5.30, 

Lymph # 1.6, Mono # 0.5, Eos # 0.1, Baso # 0.03


10/05/17 08:05: POC Glucose (mg/dL) 142 H











- RAD Interpretation


Radiology Orders: 








10/05/17 08:09


CHEST ONE VIEW [RAD] Stat 


DUPLEX LOWER EXTRM VEIN BILAT [US] Stat 














- Medication Orders


Current Medication Orders: 











Discontinued Medications





Sodium Chloride (Sodium Chloride 0.9%)  1,000 mls @ 1,000 mls/hr IV .Q1H STA


   Stop: 10/05/17 09:08


   Last Admin: 10/05/17 08:35  Dose: 1,000 mls/hr





eMAR Start Stop


 Document     10/05/17 08:35  MD  (Rec: 10/05/17 08:35  MD  [id number])


     Intravenous Solution


      Start Date                                 10/05/17


      Start Time                                 08:35


      End Date                                   10/05/17


      End time                                   10:09


      Total Infusion Time                        94














- Scribe Statement


The provider has reviewed the documentation as recorded by the Luís Garcia





Provider Scribe Attestation:


All medical record entries made by the Scribe were at my direction and 

personally dictated by me. I have reviewed the chart and agree that the record 

accurately reflects my personal performance of the history, physical exam, 

medical decision making, and the department course for this patient. I have 

also personally directed, reviewed, and agree with the discharge instructions 

and disposition. 





Disposition/Present on Arrival





- Present on Arrival


Any Indicators Present on Arrival: No


History of DVT/PE: No


History of Uncontrolled Diabetes: No


Urinary Catheter: No


History of Decub. Ulcer: No


History Surgical Site Infection Following: None





- Disposition


Have Diagnosis and Disposition been Completed?: Yes


Diagnosis: 


 Palpitations





Disposition: HOME/ ROUTINE


Disposition Time: 10:12


Patient Plan: Discharge


Condition: GOOD


Discharge Instructions (ExitCare):  Palpitations (ED), Weakness (ED)


Additional Instructions: 


PLEASE REPORT TO DR. NOLASCO OFFICE TODAY FOR FOLLOW UP


RETURN TO THE ER RIGHT AWAY FOR NEW OR WORSENING SYMPTOMS OR IF YOU CANNOT 

FOLLOW UP AS INSTRUCTED


Referrals: 


Carla Moraes MD [Primary Care Provider] - Follow up with primary


Iron Zaman MD [Staff Provider] - Follow up with primary


Forms:  [facility] (English), WORK NOTE

## 2017-10-05 NOTE — CARD
--------------- APPROVED REPORT --------------





EKG Measurement

Heart [id number]

CA [id number]

[id number]

[id number]

[id number]



<Conclusion>

Sinus rhythm with premature atrial complexes

Otherwise normal ECG

## 2018-01-18 ENCOUNTER — HOSPITAL ENCOUNTER (EMERGENCY)
Dept: HOSPITAL 42 - ED | Age: 53
Discharge: HOME | End: 2018-01-18
Payer: COMMERCIAL

## 2018-01-18 VITALS
SYSTOLIC BLOOD PRESSURE: 127 MMHG | TEMPERATURE: 98.2 F | OXYGEN SATURATION: 100 % | RESPIRATION RATE: 18 BRPM | HEART RATE: 70 BPM | DIASTOLIC BLOOD PRESSURE: 79 MMHG

## 2018-01-18 VITALS — BODY MASS INDEX: 35.5 KG/M2

## 2018-01-18 DIAGNOSIS — E11.9: ICD-10-CM

## 2018-01-18 DIAGNOSIS — E87.6: Primary | ICD-10-CM

## 2018-01-18 DIAGNOSIS — E78.5: ICD-10-CM

## 2018-01-18 DIAGNOSIS — I10: ICD-10-CM

## 2018-01-18 DIAGNOSIS — F41.9: ICD-10-CM

## 2018-01-18 LAB
ALBUMIN SERPL-MCNC: 4 G/DL (ref 3–4.8)
ALBUMIN/GLOB SERPL: 1.3 {RATIO} (ref 1.1–1.8)
ALT SERPL-CCNC: 46 U/L (ref 7–56)
APAP SERPL-MCNC: < 10 UG/ML (ref 10–20)
APPEARANCE UR: CLEAR
AST SERPL-CCNC: 23 U/L (ref 14–36)
BACTERIA #/AREA URNS HPF: (no result) /[HPF]
BASOPHILS # BLD AUTO: 0.05 K/MM3 (ref 0–2)
BASOPHILS NFR BLD: 0.5 % (ref 0–3)
BILIRUB UR-MCNC: NEGATIVE MG/DL
BUN SERPL-MCNC: 21 MG/DL (ref 7–21)
CALCIUM SERPL-MCNC: 9.9 MG/DL (ref 8.4–10.5)
COLOR UR: YELLOW
EOSINOPHIL # BLD: 0.1 10*3/UL (ref 0–0.7)
EOSINOPHIL NFR BLD: 1.5 % (ref 1.5–5)
ERYTHROCYTE [DISTWIDTH] IN BLOOD BY AUTOMATED COUNT: 13.2 % (ref 11.5–14.5)
GFR NON-AFRICAN AMERICAN: > 60
GLUCOSE UR STRIP-MCNC: NEGATIVE MG/DL
GRANULOCYTES # BLD: 5.67 10*3/UL (ref 1.4–6.5)
GRANULOCYTES NFR BLD: 61.3 % (ref 50–68)
HGB BLD-MCNC: 13.6 G/DL (ref 12–16)
LEUKOCYTE ESTERASE UR-ACNC: NEGATIVE LEU/UL
LYMPHOCYTES # BLD: 2.7 10*3/UL (ref 1.2–3.4)
LYMPHOCYTES NFR BLD AUTO: 29.6 % (ref 22–35)
MCH RBC QN AUTO: 30.6 PG (ref 25–35)
MCHC RBC AUTO-ENTMCNC: 34.3 G/DL (ref 31–37)
MCV RBC AUTO: 89 FL (ref 80–105)
MONOCYTES # BLD AUTO: 0.7 10*3/UL (ref 0.1–0.6)
MONOCYTES NFR BLD: 7.1 % (ref 1–6)
PH UR STRIP: 6 [PH] (ref 4.7–8)
PLATELET # BLD: 276 10^3/UL (ref 120–450)
PMV BLD AUTO: 10.2 FL (ref 7–11)
PROT UR STRIP-MCNC: NEGATIVE MG/DL
RBC # BLD AUTO: 4.45 10^6/UL (ref 3.5–6.1)
RBC # UR STRIP: (no result) /UL
SALICYLATES SERPL-MCNC: < 1 MG/DL (ref 2–20)
SP GR UR STRIP: 1.02 (ref 1–1.03)
URINE NITRATE: NEGATIVE
UROBILINOGEN UR STRIP-ACNC: 0.2 E.U./DL
WBC # BLD AUTO: 9.3 10^3/UL (ref 4.5–11)
WBC #/AREA URNS HPF: (no result) /HPF (ref 0–6)

## 2018-01-18 NOTE — CT
EXAM:

  CT Head Without Intravenous Contrast



CLINICAL HISTORY:

  52 years old, female; Signs and symptoms; Dizziness; Additional info: Dizzy



TECHNIQUE:

  Axial computed tomography images of the head/brain without intravenous 

contrast.  All CT scans at this facility use one or more dose reduction 

techniques, viz.: automated exposure control; ma/kV adjustment per patient size 

(including targeted exams where dose is matched to indication; i.e. head); or 

iterative reconstruction technique.

  Coronal and sagittal reformatted images were created and reviewed.



COMPARISON:

  CT - HEAD W/O CONTRAST 2016-03-06 13:10



FINDINGS:

  Brain:  Mild atrophy.  No intracranial hemorrhage.  No mass.  No definite 

edema.

  Ventricles:  No hydrocephalus.

  Bones/joints:  No acute fracture.  Lucent lesion within calvarium, stable.

  Soft tissues:  Unremarkable.

  Sinuses:  No acute sinusitis.

  Mastoid air cells:  No mastoid effusion.

  Orbits:  Unremarkable as visualized.



IMPRESSION:     

1.  No definite acute intracranial abnormality.

2.  Incidental/non-acute findings are described above.

## 2018-01-18 NOTE — CT
EXAM:

  CT Abdomen and Pelvis Without Intravenous Contrast



CLINICAL HISTORY:

  52 years old, female; Pain; Abdominal pain; Generalized; Additional info: Abd 

pain



TECHNIQUE:

  Axial computed tomography images of the abdomen and pelvis without 

intravenous contrast.  All CT scans at this facility use one or more dose 

reduction techniques, viz.: automated exposure control; ma/kV adjustment per 

patient size (including targeted exams where dose is matched to indication; 

i.e. head); or iterative reconstruction technique.

  Coronal and sagittal reformatted images were created and reviewed.



COMPARISON:

  No relevant prior studies available.



FINDINGS:

  Limitations:  Lack of intravenous contrast.

  Lower thorax: No acute findings.



 ABDOMEN:

  Liver:  Unremarkable.

  Gallbladder and bile ducts:  Gallstones.  No significant ductal dilation.

  Pancreas:  Unremarkable.  No ductal dilation.

  Spleen:  No splenomegaly.

  Adrenals:  No mass.

  Kidneys and ureters:  No renal calculi.  No hydronephrosis.

  Stomach and bowel:  No definite mural thickening.  No obstruction.

  Appendix:  Normal caliber.  No inflammation.



 PELVIS:

  Bladder:  Unremarkable.  No stones.

  Reproductive:  Unremarkable as visualized.



 ABDOMEN and PELVIS:

  Intraperitoneal space:  No significant fluid collection.  No free air.

  Bones/joints:  Mild degenerative changes of spine.  No acute fracture.

  Soft tissues:  Unremarkable.

  Vasculature:  Minimal to mild atherosclerotic disease.  No aneurysm.

  Lymph nodes:  No pathologically enlarged lymph nodes.



IMPRESSION:     

1.  Cholelithiasis.

2.  Incidental/non-acute findings are described above.

## 2018-01-18 NOTE — CARD
--------------- APPROVED REPORT --------------





EKG Measurement

Heart [id number]

WI 196P12

[id number]

[id number]

[id number]



<Conclusion>

Sinus rhythm with premature atrial complex

Otherwise normal ECG

## 2018-01-18 NOTE — ED PDOC
Arrival/HPI





- General


Chief Complaint: Anxiety


Time Seen by Provider: 01/18/18 01:13


Historian: Patient





- History of Present Illness


Narrative History of Present Illness (Text): 


01/18/18 01:24


Yumiko Mulligan is a 52 year old female, whose past medical history includes 

hypertension, hyperlipidemia, diabetes, and anxiety, who presents to the 

Emergency department for abdominal discomfort and anxiety. Patient states she 

woke up tonight prior to arrival and felt a cold sensation and shakiness. 

Patient also reports upper abdominal discomfort radiating to her back. Patient 

states she has been in a "constant state of nervousness" due to abdominal 

discomfort, which she has been experiencing for a while. Patient states she is 

scheduled for a colonoscopy with her gastroenterologist on 02/03 but is worried 

her symptoms may be due to a more serious issue. Patient denies any fever, 

chills, chest pain, shortness of breath, nausea, vomiting, diarrhea, urinary 

symptoms, back pain,neck pain, headache, dizziness, or any other complaints.





PMD: Dr. Moraes


Symptom Onset: Gradual


Symptom Course: Unchanged


Quality: Pressure


Activities at Onset: Rest, Sleeping


Context: Home





Past Medical History





- Provider Review


Nursing Documentation Reviewed: Yes





- Reproductive


Menopause: Yes





- Cardiac


Hx Cardiac Disorders: Yes (hyperlipidemia)


Hx Hypertension: Yes


Hx Peripheral Edema: Yes (bilateral leg edema +1)





- Pulmonary


Hx Respiratory Disorders: No





- Neurological


Hx Neurological Disorder: No





- HEENT


Hx HEENT Disorder: No





- Renal


Hx Renal Disorder: No





- Endocrine/Metabolic


Hx Endocrine Disorders: No





- Hematological/Oncological


Hx Blood Disorders: No





- Integumentary


Hx Dermatological Disorder: No





- Musculoskeletal/Rheumatological


Hx Musculoskeletal Disorders: No


Hx Arthritis:  (joint pains)


Hx Falls: No





- Gastrointestinal


Hx Gastrointestinal Disorders: No





- Genitourinary/Gynecological


Hx Genitourinary Disorders: No





- Psychiatric


Hx Psychophysiologic Disorder: Yes


Hx Anxiety: Yes


Hx Depression: Yes


Hx Substance Use: No





- Past Surgical History


Past Surgical History: No Previous





- Suicidal Assessment


Feels Threatened In Home Enviroment: No





Family/Social History





- Physician Review


Nursing Documentation Reviewed: Yes


Family/Social History: Unknown Family HX


Smoking Status: Never Smoked


Hx Alcohol Use: No


Hx Substance Use: No





Allergies/Home Meds


Allergies/Adverse Reactions: 


Allergies





No Known Allergies Allergy (Verified 01/18/18 01:18)


 








Home Medications: 


 Home Meds











 Medication  Instructions  Recorded  Confirmed


 


Losartan/Hydrochlorothiazide 1 tab PO DAILY 04/22/16 01/18/18





[Hyzaar 100-25 Tablet]   


 


Aspirin [Ecotrin] 81 mg PO DAILY 10/05/17 01/18/18


 


Atorvastatin [Lipitor] 20 mg PO .EVERY OTHER DAY 10/05/17 01/18/18


 


Sertraline [Zoloft] 50 mg PO HS 01/18/18 01/18/18














Review of Systems





- Physician Review


All systems were reviewed & negative as marked: Yes





- Review of Systems


Constitutional: Normal.  absent: Fevers


Eyes: Normal


ENT: Normal


Respiratory: Normal.  absent: SOB, Cough


Cardiovascular: Normal.  absent: Chest Pain


Gastrointestinal: Abdominal Pain.  absent: Diarrhea, Nausea, Vomiting


Genitourinary Female: Normal.  absent: Dysuria, Frequency, Hematuria, Urine 

Output Changes


Musculoskeletal: Normal.  absent: Back Pain, Neck Pain


Skin: Normal.  absent: Rash


Neurological: Normal.  absent: Headache, Dizziness


Endocrine: Normal


Hemo/Lymphatic: Normal


Psychiatric: Anxiety





Physical Exam


Vital Signs Reviewed: Yes


Vital Signs











  Temp Pulse Resp BP Pulse Ox


 


 01/18/18 01:11  98.2 F  70  18  127/79  100











Temperature: Afebrile


Blood Pressure: Normal


Pulse: Regular


Respiratory Rate: Normal


Appearance: Positive for: Well-Appearing, Non-Toxic, Comfortable


Pain Distress: None


Mental Status: Positive for: Alert and Oriented X 3





- Systems Exam


Head: Present: Atraumatic, Normocephalic


Pupils: Present: PERRL


Extroacular Muscles: Present: EOMI


Conjunctiva: Present: Normal


Mouth: Present: Moist Mucous Membranes


Neck: Present: Normal Range of Motion


Respiratory/Chest: Present: Clear to Auscultation, Good Air Exchange.  No: 

Respiratory Distress, Accessory Muscle Use


Cardiovascular: Present: Regular Rate and Rhythm, Normal S1, S2.  No: Murmurs


Abdomen: Present: Normal Bowel Sounds.  No: Tenderness, Distention, Peritoneal 

Signs


Back: Present: Normal Inspection


Upper Extremity: Present: Normal Inspection.  No: Cyanosis, Edema


Lower Extremity: Present: Normal Inspection.  No: Edema


Neurological: Present: GCS=15, CN II-XII Intact, Speech Normal


Skin: Present: Warm, Dry, Normal Color.  No: Rashes


Psychiatric: Present: Alert, Oriented x 3, Normal Insight, Normal Concentration





Medical Decision Making


ED Course and Treatment: 


01/18/18 01:24


Impression:


52 year old female complaining of anxiety and upper abdominal discomfort.





Plan:


-- CT Abdomen and Pelvis


-- EKG


-- Labs, alcohol level


-- Urinalysis, urine drug screen


-- Reassess and disposition





Prior Visits:


Notes and results from previous visits were reviewed.


On 10/05/2017, pt was seen in the Emergency department for dizziness, 

generalized weakness, and palpitaions. Pt was d/c home.





Progress Notes:


01/18/18 02:12


Reviewed EKG, sinus rhythm at 61 bpm. Premature atrial complexes. No acute 

changes.





01/18/18 05:39


CT Abdomen and Pelvis shows:


Limitations: Lack of intravenous contrast.


Lower thorax: No acute findings.


ABDOMEN:


Liver: Unremarkable.


Gallbladder and bile ducts: Gallstones. No significant ductal dilation.


Pancreas: Unremarkable. No ductal dilation.


Spleen: No splenomegaly.


Adrenals: No mass.


Kidneys and ureters: No renal calculi. No hydronephrosis.


Stomach and bowel: No definite mural thickening. No obstruction.


Appendix: Normal caliber. No inflammation.


PELVIS:


Bladder: Unremarkable. No stones.


Reproductive: Unremarkable as visualized.


ABDOMEN and PELVIS:


Intraperitoneal space: No significant fluid collection. No free air.


Bones/joints: Mild degenerative changes of spine. No acute fracture.


Soft tissues: Unremarkable.


Vasculature: Minimal to mild atherosclerotic disease. No aneurysm.


Lymph nodes: No pathologically enlarged lymph nodes.


IMPRESSION:


1. Cholelithiasis.


2. Incidental/non-acute findings are described above.





CT Head shows:


Brain: Mild atrophy. No intracranial hemorrhage. No mass. No definite edema.


Ventricles: No hydrocephalus.


Bones/joints: No acute fracture. Lucent lesion within calvarium, stable.


Soft tissues: Unremarkable.


Sinuses: No acute sinusitis.


Mastoid air cells: No mastoid effusion.


Orbits: Unremarkable as visualized.


IMPRESSION:


1. No definite acute intracranial abnormality.


2. Incidental/non-acute findings are described above.





01/18/18 05:40


Pt seen and evaluated by BERNY Dill, who discussed case with 

psychiatrist on call. Pt psychiatrically cleared for discharge.





01/18/18 05:47


On re-evaluation, patient feels better and is in no acute distress. I have 

discussed the results and plan with the patient, who expresses understanding. 

Patient in agreement with plan to be discharged home. Patient is stable for 

discharge. Patient was instructed to follow up with physician or return if 

symptoms worsen or new concerning symptoms arise.














- Lab Interpretations


Lab Results: 








 01/18/18 01:44 





 01/18/18 01:44 





 Lab Results





01/18/18 02:15: Urine Opiates Screen Negative, Urine Methadone Screen Negative, 

Ur Barbiturates Screen Negative, Ur Phencyclidine Scrn Negative, Ur 

Amphetamines Screen Negative, U Benzodiazepines Scrn Negative, U Oth Cocaine 

Metabols Negative, U Cannabinoids Screen Negative


01/18/18 01:44: Alcohol, Quantitative < 10


01/18/18 01:44: Salicylates < 1 L, Acetaminophen < 10.0 L


01/18/18 01:44: Sodium 139, Potassium 3.1 L, Chloride 104, Carbon Dioxide 23, 

Anion Gap 16, BUN 21, Creatinine 0.6 L, Est GFR ( Amer) > 60, Est GFR (

Non-Af Amer) > 60, Random Glucose 101, Calcium 9.9, Total Bilirubin 0.6, AST 23

, ALT 46, Alkaline Phosphatase 61, Total Protein 7.2, Albumin 4.0, Globulin 3.2

, Albumin/Globulin Ratio 1.3


01/18/18 01:44: Urine Color Yellow, Urine Appearance Clear, Urine pH 6.0, Ur 

Specific Gravity 1.020, Urine Protein Negative, Urine Glucose (UA) Negative, 

Urine Ketones Negative, Urine Blood Small H, Urine Nitrate Negative, Urine 

Bilirubin Negative, Urine Urobilinogen 0.2, Ur Leukocyte Esterase Negative, 

Urine RBC 1 - 3, Urine WBC 0 - 2, Ur Epithelial Cells 1 - 3, Urine Bacteria Rare


01/18/18 01:44: WBC 9.3  D, RBC 4.45, Hgb 13.6, Hct 39.6, MCV 89.0, MCH 30.6, 

MCHC 34.3, RDW 13.2, Plt Count 276, MPV 10.2, Gran % 61.3, Lymph % (Auto) 29.6, 

Mono % (Auto) 7.1 H, Eos % (Auto) 1.5, Baso % (Auto) 0.5, Gran # 5.67, Lymph # 

2.7, Mono # 0.7 H, Eos # 0.1, Baso # 0.05








I have reviewed the lab results: Yes





- RAD Interpretation


Radiology Orders: 








01/18/18 03:45


ABD & PELVIS W/O PO OR IV CONT [CT] Stat 





01/18/18 03:46


HEAD W/O CONTRAST [CT] Stat 











: Radiologist





- EKG Interpretation


Interpreted by ED Physician: Yes


Type: 12 lead EKG





- Medication Orders


Current Medication Orders: 











Discontinued Medications





Lorazepam (Ativan)  0.5 mg PO ONCE ONE


   PRN Reason: Protocol


   Stop: 01/18/18 03:13


   Last Admin: 01/18/18 03:53  Dose: 0.5 mg





Ondansetron HCl (Zofran Odt)  8 mg PO STAT STA


   Stop: 01/18/18 05:35


   Last Admin: 01/18/18 05:56  Dose: 8 mg





Potassium Chloride (K-Dur 20 Meq Er Tab)  40 meq PO ONCE ONE


   Stop: 01/18/18 02:48


   Last Admin: 01/18/18 03:05  Dose: 40 meq











- Scribe Statement


The provider has reviewed the documentation as recorded by the Luís Noriega





Provider Scribe Attestation:


All medical record entries made by the Balwinderibbettina were at my direction and 

personally dictated by me. I have reviewed the chart and agree that the record 

accurately reflects my personal performance of the history, physical exam, 

medical decision making, and the department course for this patient. I have 

also personally directed, reviewed, and agree with the discharge instructions 

and disposition.








Disposition/Present on Arrival





- Present on Arrival


Any Indicators Present on Arrival: No


History of DVT/PE: No


History of Uncontrolled Diabetes: No


Urinary Catheter: No


History of Decub. Ulcer: No


History Surgical Site Infection Following: None





- Disposition


Have Diagnosis and Disposition been Completed?: Yes


Diagnosis: 


 Anxiety, Hypokalemia





Disposition: HOME/ ROUTINE


Disposition Time: 05:47


Condition: GOOD


Discharge Instructions (ExitCare):  Hypokalemia (ED), Anxiety (ED)


Forms:  [facility] (English)

## 2018-01-23 ENCOUNTER — HOSPITAL ENCOUNTER (INPATIENT)
Dept: HOSPITAL 42 - ED | Age: 53
LOS: 3 days | Discharge: HOME | DRG: 881 | End: 2018-01-26
Attending: PSYCHIATRY & NEUROLOGY | Admitting: PSYCHIATRY & NEUROLOGY
Payer: COMMERCIAL

## 2018-01-23 VITALS — OXYGEN SATURATION: 96 %

## 2018-01-23 VITALS — BODY MASS INDEX: 35.5 KG/M2

## 2018-01-23 DIAGNOSIS — F32.9: Primary | ICD-10-CM

## 2018-01-23 DIAGNOSIS — N39.0: ICD-10-CM

## 2018-01-23 DIAGNOSIS — Z79.82: ICD-10-CM

## 2018-01-23 DIAGNOSIS — E11.9: ICD-10-CM

## 2018-01-23 DIAGNOSIS — E78.00: ICD-10-CM

## 2018-01-23 DIAGNOSIS — F41.1: ICD-10-CM

## 2018-01-23 DIAGNOSIS — I10: ICD-10-CM

## 2018-01-23 DIAGNOSIS — K80.20: ICD-10-CM

## 2018-01-23 DIAGNOSIS — D64.9: ICD-10-CM

## 2018-01-23 LAB
ALBUMIN SERPL-MCNC: 4.4 G/DL (ref 3–4.8)
ALBUMIN/GLOB SERPL: 1.3 {RATIO} (ref 1.1–1.8)
ALT SERPL-CCNC: 39 U/L (ref 7–56)
AMORPH SED URNS QL MICRO: (no result)
APPEARANCE UR: CLEAR
APTT BLD: 33.8 SECONDS (ref 25.1–36.5)
AST SERPL-CCNC: 26 U/L (ref 14–36)
BACTERIA #/AREA URNS HPF: (no result) /[HPF]
BASE EXCESS BLDV CALC-SCNC: 3 MMOL/L (ref 0–2)
BASOPHILS # BLD AUTO: 0.04 K/MM3 (ref 0–2)
BASOPHILS NFR BLD: 0.4 % (ref 0–3)
BILIRUB UR-MCNC: NEGATIVE MG/DL
BUN SERPL-MCNC: 11 MG/DL (ref 7–21)
CALCIUM SERPL-MCNC: 10.3 MG/DL (ref 8.4–10.5)
COLOR UR: YELLOW
EOSINOPHIL # BLD: 0.1 10*3/UL (ref 0–0.7)
EOSINOPHIL NFR BLD: 0.6 % (ref 1.5–5)
ERYTHROCYTE [DISTWIDTH] IN BLOOD BY AUTOMATED COUNT: 12.9 % (ref 11.5–14.5)
GFR NON-AFRICAN AMERICAN: > 60
GLUCOSE UR STRIP-MCNC: NEGATIVE MG/DL
GRANULOCYTES # BLD: 6.45 10*3/UL (ref 1.4–6.5)
GRANULOCYTES NFR BLD: 67.3 % (ref 50–68)
HGB BLD-MCNC: 15 G/DL (ref 12–16)
INR PPP: 1.08 (ref 0.93–1.08)
LEUKOCYTE ESTERASE UR-ACNC: (no result) LEU/UL
LIPASE SERPL-CCNC: 165 U/L (ref 23–300)
LYMPHOCYTES # BLD: 2.4 10*3/UL (ref 1.2–3.4)
LYMPHOCYTES NFR BLD AUTO: 25.5 % (ref 22–35)
MCH RBC QN AUTO: 30.8 PG (ref 25–35)
MCHC RBC AUTO-ENTMCNC: 35 G/DL (ref 31–37)
MCV RBC AUTO: 88.1 FL (ref 80–105)
MONOCYTES # BLD AUTO: 0.6 10*3/UL (ref 0.1–0.6)
MONOCYTES NFR BLD: 6.2 % (ref 1–6)
PH BLDV: 7.4 [PH] (ref 7.32–7.43)
PH UR STRIP: 6 [PH] (ref 4.7–8)
PLATELET # BLD: 290 10^3/UL (ref 120–450)
PMV BLD AUTO: 10.4 FL (ref 7–11)
PROT UR STRIP-MCNC: NEGATIVE MG/DL
PROTHROMBIN TIME: 12.4 SECONDS (ref 9.4–12.5)
RBC # BLD AUTO: 4.87 10^6/UL (ref 3.5–6.1)
RBC # UR STRIP: (no result) /UL
SP GR UR STRIP: 1.02 (ref 1–1.03)
TROPONIN I SERPL-MCNC: < 0.01 NG/ML
URINE NITRATE: NEGATIVE
UROBILINOGEN UR STRIP-ACNC: 0.2 E.U./DL
VENOUS BLOOD FIO2: 21 %
VENOUS BLOOD GAS PCO2: 46 (ref 40–60)
VENOUS BLOOD GAS PO2: 45 MM/HG (ref 30–55)
WBC # BLD AUTO: 9.6 10^3/UL (ref 4.5–11)

## 2018-01-23 NOTE — CARD
--------------- APPROVED REPORT --------------





EKG Measurement

Heart [id number]

IN [id number]

[id number]

[id number]

[id number]



<Conclusion>

Normal sinus rhythm

Inferior infarct, age undetermined

Abnormal ECG

## 2018-01-23 NOTE — US
HISTORY:

pain



COMPARISON:

None.



TECHNIQUE:

Sonographic evaluation of the abdomen.



FINDINGS:



LIVER:

Measures 14.4 cm.  Normal echogenicity of the liver parenchyma. No 

mass. No intrahepatic bile duct dilatation.



GALLBLADDER:

Cholelithiasis. No mural thickening. No pericholecystic fluid. 

Negative sonographic Donahue sign.



COMMON BILE DUCT:

Measures 4 mm. No stones. No dilatation.



PANCREAS:

Unremarkable as visualized. No mass. No ductal dilatation.



RIGHT KIDNEY:

Measures 12.9cm. Normal echogenicity. No calculus, mass, or 

hydronephrosis.



LEFT KIDNEY:

Measures 12.4cm. Normal echogenicity. No calculus, mass, or 

hydronephrosis.



SPLEEN:

Normal in size and contour. No mass.



AORTA:

No aneurysmal dilatation. 



IVC:

Unremarkable. 



OTHER FINDINGS:

None. 



IMPRESSION:

Cholelithiasis without sonographic evidence of cholecystitis.

## 2018-01-23 NOTE — ED PDOC
Arrival/HPI





- General


Chief Complaint: Abdominal Pain


Time Seen by Provider: 01/23/18 06:01


Historian: Patient





- History of Present Illness


Narrative History of Present Illness (Text): 





01/23/18 07:25


52 year old female, whose past medical history includes hypertension, 

hyperlipidemia, diabetes, and anxiety, presents to the emergency department 

complaining of abdominal pain radiating to the back associated with nausea for 

the past few weeks. [place] [facility] [place] for similar symptoms and had a CT Head and CT ABD & Pelvis which did not 

show anything significant. Patient presents for similar symptoms. Patient 

report occasional drinking, but denies smoking, substance abuse, any fever, 

chills, chest pain, shortness of breath, vomiting, diarrhea, urinary symptoms, 

neck pain, headache, dizziness, or any other complaints.  





PMD: Dr. Moraes





Time/Duration: Other (few weeks)


Symptom Course: Unchanged


Activities at Onset: Light


Context: Home





Past Medical History





- Provider Review


Nursing Documentation Reviewed: Yes





- Cardiac


Hx Cardiac Disorders: Yes (hyperlipidemia)


Hx Hypertension: Yes


Hx Peripheral Edema: Yes (bilateral leg edema +1)





- Pulmonary


Hx Respiratory Disorders: No





- Neurological


Hx Neurological Disorder: No





- HEENT


Hx HEENT Disorder: No





- Renal


Hx Renal Disorder: No





- Endocrine/Metabolic


Hx Endocrine Disorders: No





- Hematological/Oncological


Hx Blood Disorders: No





- Integumentary


Hx Dermatological Disorder: No





- Musculoskeletal/Rheumatological


Hx Musculoskeletal Disorders: No


Hx Arthritis:  (joint pains)


Hx Falls: No





- Gastrointestinal


Hx Gastrointestinal Disorders: No





- Genitourinary/Gynecological


Hx Genitourinary Disorders: No





- Psychiatric


Hx Psychophysiologic Disorder: Yes


Hx Anxiety: Yes


Hx Depression: Yes


Hx Substance Use: No





- Past Surgical History


Past Surgical History: No Previous





- Suicidal Assessment


Feels Threatened In Home Enviroment: No





Family/Social History





- Physician Review


Nursing Documentation Reviewed: Yes


Family/Social History: No Known Family HX


Smoking Status: Never Smoked


Hx Alcohol Use: No


Hx Substance Use: No





Allergies/Home Meds


Allergies/Adverse Reactions: 


Allergies





No Known Allergies Allergy (Verified 01/23/18 06:00)


 








Home Medications: 


 Home Meds











 Medication  Instructions  Recorded  Confirmed


 


Losartan/Hydrochlorothiazide 1 tab PO DAILY 04/22/16 01/23/18





[Hyzaar 100-25 Tablet]   


 


Aspirin [Ecotrin] 81 mg PO HS 10/05/17 01/23/18


 


Atorvastatin [Lipitor] 20 mg PO .EVERY OTHER DAY 10/05/17 01/23/18


 


Sertraline [Zoloft] 50 mg PO HS 01/18/18 01/23/18














Review of Systems





- Physician Review


All systems were reviewed & negative as marked: Yes





- Review of Systems


Constitutional: absent: Fevers, Other (Chills)


Respiratory: absent: SOB


Cardiovascular: absent: Chest Pain


Gastrointestinal: Abdominal Pain, Nausea.  absent: Diarrhea, Vomiting


Musculoskeletal: Back Pain.  absent: Neck Pain


Neurological: absent: Headache, Dizziness





Physical Exam


Vital Signs Reviewed: Yes


Vital Signs











  Temp Pulse Resp BP Pulse Ox


 


 01/23/18 12:15  98.9 F  61  18  141/82  96


 


 01/23/18 09:14  98.9 F  63  18  125/82  97


 


 01/23/18 06:09   72  18  130/77  97


 


 01/23/18 06:00  97.8 F    











Temperature: Afebrile


Blood Pressure: Normal


Pulse: Regular


Respiratory Rate: Normal


Appearance: Positive for: Well-Appearing, Non-Toxic, Comfortable


Pain Distress: None


Mental Status: Positive for: Alert and Oriented X 3





- Systems Exam


Head: Present: Atraumatic, Normocephalic


Pupils: Present: PERRL


Extroacular Muscles: Present: EOMI


Conjunctiva: Present: Normal


Mouth: Present: Moist Mucous Membranes


Neck: Present: Normal Range of Motion


Respiratory/Chest: Present: Clear to Auscultation, Good Air Exchange.  No: 

Respiratory Distress, Accessory Muscle Use


Cardiovascular: Present: Regular Rate and Rhythm, Normal S1, S2.  No: Murmurs


Abdomen: Present: Tenderness, Normal Bowel Sounds.  No: Distention, Peritoneal 

Signs, Rebound, Guarding


Back: Present: Normal Inspection.  No: CVA Tenderness


Upper Extremity: Present: Normal Inspection.  No: Cyanosis, Edema


Lower Extremity: Present: Normal Inspection.  No: Edema


Neurological: Present: GCS=15, CN II-XII Intact, Speech Normal


Skin: Present: Warm, Dry, Normal Color.  No: Rashes


Psychiatric: Present: Alert, Oriented x 3, Normal Insight, Normal Concentration

, Anxious (severe anxiety )





Medical Decision Making


ED Course and Treatment: 





01/23/18 07:25


Impression:


52 year old female presents complaining of abdominal pain radiating to the back 

associated with nausea. 





Plan:


-- VBG 


-- EKG 


-- Labs


-- Chest X-ray 


-- Protonix Inj


-- IV Fluids


-- Zofran Inj


-- Urinalysis 


-- Abdomen Complete US 


-- Reassess and disposition





Prior Visits:


Notes and results from previous visits were reviewed. Patient was last seen in 

the emergency department on 01/18/18 presents for abdominal discomfort 

radiating to her back and anxiety. Patient was discharged. 





Progress Notes:





PROCEDURE: Abdomen Complete


Dictator : Weston Harvey MD


Report Date : 01/23/2018 08:55:40


IMPRESSION:


Cholelithiasis without sonographic evidence of cholecystitis.





01/23/18 10:55


PE normal. Discussed with patient who states she has an appointment with 

psychologist tomorrow. She does not feel like she can wait due to her anxiety 

currently overwhelming her now. States she wants to see someone. Called Crisis 

for her. 


01/23/18 13:43


EKG shows normal sinus rhythm rate approximately 65 with Q waves inferiorly and 

no acute ST or T-wave changes


01/23/18 14:05


Seen and evaluated by crisis who will admit to psychiatric floor.





- Lab Interpretations


Lab Results: 








 01/23/18 09:35 





 01/23/18 09:35 





 Lab Results





01/23/18 09:35: Sodium 142, Chloride 104, Potassium 3.7, Carbon Dioxide 26, 

Anion Gap 15, BUN 11, Creatinine 0.6 L, Est GFR ( Amer) > 60, Est GFR (

Non-Af Amer) > 60, Random Glucose 95, Calcium 10.3, Total Bilirubin 0.7, AST 26

, ALT 39, Alkaline Phosphatase 63, Lactate Dehydrogenase 413, Total Creatine 

Kinase 46, Troponin I < 0.01, Total Protein 7.7, Albumin 4.4, Globulin 3.3, 

Albumin/Globulin Ratio 1.3, Lipase 165


01/23/18 09:35: pO2 45, VBG pH 7.40, VBG pCO2 46.0, VBG HCO3 28.5 H, VBG Total 

CO2 29.9 H, VBG O2 Sat (Calc) 88.1 H, VBG Base Excess 3.0 H, VBG Potassium 4.1, 

Sodium 140.0, Chloride 103.0, Glucose 95, Lactate 1.2, FiO2 21.0, Venous Blood 

Potassium 4.1


01/23/18 09:35: PT 12.4, INR 1.08, APTT 33.8


01/23/18 09:35: WBC 9.6, RBC 4.87, Hgb 15.0, Hct 42.9, MCV 88.1, MCH 30.8, MCHC 

35.0, RDW 12.9, Plt Count 290, MPV 10.4, Gran % 67.3, Lymph % (Auto) 25.5, Mono 

% (Auto) 6.2 H, Eos % (Auto) 0.6 L, Baso % (Auto) 0.4, Gran # 6.45, Lymph # 2.4

, Mono # 0.6, Eos # 0.1, Baso # 0.04


01/23/18 08:16: Urine Color Yellow, Urine Appearance Clear, Urine pH 6.0, Ur 

Specific Gravity 1.025, Urine Protein Negative, Urine Glucose (UA) Negative, 

Urine Ketones Negative, Urine Blood Small H, Urine Nitrate Negative, Urine 

Bilirubin Negative, Urine Urobilinogen 0.2, Ur Leukocyte Esterase Small H, 

Urine RBC 2 - 5, Urine WBC 5 - 10, Ur Epithelial Cells 4 - 5, Amorphous 

Sediment Few, Urine Bacteria Many, Urine Other Uyeast








I have reviewed the lab results: Yes





- RAD Interpretation


Radiology Orders: 








01/23/18 07:29


CHEST PORTABLE [RAD] Stat 





01/23/18 07:31


ABDOMEN COMPLETE [US] Stat 














- EKG Interpretation


Interpreted by ED Physician: Yes


Type: 12 lead EKG





- Medication Orders


Current Medication Orders: 











Discontinued Medications





Sodium Chloride (Sodium Chloride 0.9%)  500 mls @ 500 mls/hr IV ONCE ONE


   Stop: 01/23/18 08:29


   Last Admin: 01/23/18 10:27  Dose: 500 mls/hr





eMAR Start Stop


 Document     01/23/18 10:27  OCS  (Rec: 01/23/18 10:27  OCS  [id number])


     Intravenous Solution


      Start Date                                 01/23/18


      Start Time                                 10:27


      End Date                                   01/23/18


      End time                                   11:27


      Total Infusion Time                        60





Ondansetron HCl (Zofran Inj)  4 mg IVP ONCE ONE


   Stop: 01/23/18 07:31


   Last Admin: 01/23/18 10:27  Dose: 4 mg





IVP Administration


 Document     01/23/18 10:27  OCS  (Rec: 01/23/18 10:27  OCS  [id number])


     Charges for Administration


      # of IVP Administrations                   1





Pantoprazole Sodium (Protonix Inj)  40 mg IVP ONCE STA


   Stop: 01/23/18 07:31


   Last Admin: 01/23/18 10:27  Dose: 40 mg





IVP Administration


 Document     01/23/18 10:27  OCS  (Rec: 01/23/18 10:27  [facility]  [id number])


     Charges for Administration


      # of IVP Administrations                   1














- Scribe Statement


The provider has reviewed the documentation as recorded by the Luís Galindo





Provider Scribe Attestation:


All medical record entries made by the Luís were at my direction and 

personally dictated by me. I have reviewed the chart and agree that the record 

accurately reflects my personal performance of the history, physical exam, 

medical decision making, and the department course for this patient. I have 

also personally directed, reviewed, and agree with the discharge instructions 

and disposition.








Disposition/Present on Arrival





- Present on Arrival


Any Indicators Present on Arrival: No


History of DVT/PE: No


History of Uncontrolled Diabetes: No


Urinary Catheter: No


History of Decub. Ulcer: No


History Surgical Site Infection Following: None





- Disposition


Have Diagnosis and Disposition been Completed?: Yes


Diagnosis: 


 Depression, Anxiety





Disposition: HOSPITALIZED


Disposition Time: 14:06


Patient Plan: Admission


Condition: GOOD


Forms:  [email or web address] (English)

## 2018-01-24 VITALS — RESPIRATION RATE: 20 BRPM

## 2018-01-24 LAB
HDLC SERPL-MCNC: 38 MG/DL (ref 29–60)
LDLC SERPL-MCNC: 116 MG/DL (ref 0–129)

## 2018-01-24 RX ADMIN — POLYETHYLENE GLYCOL 3350 SCH GM: 17 POWDER, FOR SOLUTION ORAL at 13:23

## 2018-01-24 RX ADMIN — HYDROCORTISONE SCH CRE: 25 CREAM TOPICAL at 18:09

## 2018-01-24 NOTE — PCM.PSYCH
Initial Psychiatric Evaluation





- Initial Psychiatric Evaluation


Type of Admission: Voluntary


Legal Status: Capacity (pt has capacity to sign consent for treatment)


Chief Complaint (in patient's own words): 





"It is little too complicated, I was very anxious, I was not able to function, 

I was constantly on the internet, checking my symptoms, for example I thought I 

did not have fistula, but may be I have cancer..., at the same time I am afraid 

of colonoscopy, I understand it is irrational, but I am afraid that I am going 

to die under anesthesia..., I was not able to sleep, I have no appetite, I lost 

20+pounds for the past two months..."


Patient's Reaction to Hospitalization: 





pt was admitted for evaluation of worsening of depression, worsening anxiety, 

inability of function. 


History of Present Illness and Precipitating Events: 


shortly patient is 52 year old  female, self reported a history 

depression and anxiety, denied previous psychiatric admissions, denied h/o 

suicidal attempts, pt has multiple medical problems including HTN, GI fistula, 

dyslipidemia, strong family h/o mental illness of schizophrenia and bipolar 

spectrum, came to the hospital for abdominal discomfort, ED physician was 

concerned about pt's presentation, pt was anxious, tearful and it was the 

second visit to ED for the past week (18 and 18) for the similar 

symptoms and psychiatric evaluation was called. during the initial evaluation 

at ED by PES pt was found to be very anxious, difficulties to concentrate, 

depressed, tearful, pt mentioned that unintentionally she lost 20 Lb for the 

past two months, was refusing to have medical tests done (colonoscopy), pt had 

scheduled appt with Dr.Paul Tomlinson (local psychiatrist) today 18, but was 

not able to wait till the morning time to see the psychiatrist and requested 

psych admission. 





pt was seen today at the morning at the treatment team meeting, pt presented 

with acceptable personal hygiene, but appears to be careless for her appearance

, long/uncombed hair, pulled back to the ponytail, no make up, dark circles 

under eyes, tearful, flat and depressed affect, good ADLs. 





pt said that she went through a lot of losses, pt's boyfriend of 10+years  

4years ago, pt still blames herself for not doing more that she did, pt said 

that she lost her mother in  because of lung cancer, father  of heart 

attack in , pt has only one sister who has multiple medical problems. 





Pt said that she was always depressed but now "it is out of control, I am 

crying for small little things, I cannot sleep, maximum I sleep for couple of 

hours", pt said that she is not suicidal or homicidal but her symptoms are 

affecting her functionality, pt said that she is afraid of dying and that is 

why she refused to have colonoscopy "I know it is irrational but I am afraid 

that I will never wake up after anesthesia". pt said that she lost 20+Lb 

unintentionally because she lost her appetite for the past two months. "I used 

to be resilient in crisis situations, but now I feel I not able to handle 

anything"





pt reproted that she feels "constant state of nervousness" pt said that she is 

worried about her medical issues "it is out of control, I am cheking and 

rechecking my symptoms, I feel like I have no fistula but I have a cancer, then 

I am worried about my health, about future, about bills, about my sister...". 

pt said that she has episodes when she could feel her chest is tight, 

difficulties to breath, shakines, fear of dying which brought pt to ED on . 





pt said that her GI doctor prescribed zoloft on 1/15/18 after what "I feel worse

, I feel more anxious". 





pt denied manic symptoms, none identified. 





pt denied v/a/t hallucinations, denied paranoid ideation. 





Past psych h/o: GI prescribed zoloft, but pt was more anxious on this medication

,  prescribed paxil and ativan, but pt did not feel comfortable to take 

meds. 





Medical h/o: see above. 





Social h/o: pt lives alone, works, no h/o drugs or alcohol or smoking. 





Family h/o; strong family h/o mental illness, pt has h/o bipolar and ? 

schizophrenia, alcohol addiction, tried to commit suicide. sister is on 

disability due to bipolar disorder, currently on valium, vistaril, zoloft, 

xanax. 





pt made a request she does not want to be on medication which potentially could 

give addiction. 


this writer educated about paxil at night and vistaril for anxiety.


risk/benefits and alternatives discussed. 














 18 09:35 





 18 09:35 





 Lab Results





18 07:45: TSH 3rd Generation 0.85


18 07:45: Fasting Glucose 105, Triglycerides 99, Cholesterol 177, LDL 

Cholesterol Direct 116, HDL Cholesterol 38


18 09:35: Sodium 142, Chloride 104, Potassium 3.7, Carbon Dioxide 26, 

Anion Gap 15, BUN 11, Creatinine 0.6 L, Est GFR ( Amer) > 60, Est GFR (

Non-Af Amer) > 60, Random Glucose 95, Calcium 10.3, Total Bilirubin 0.7, AST 26

, ALT 39, Alkaline Phosphatase 63, Lactate Dehydrogenase 413, Total Creatine 

Kinase 46, Troponin I < 0.01, Total Protein 7.7, Albumin 4.4, Globulin 3.3, 

Albumin/Globulin Ratio 1.3, Lipase 165


18 09:35: pO2 45, VBG pH 7.40, VBG pCO2 46.0, VBG HCO3 28.5 H, VBG Total 

CO2 29.9 H, VBG O2 Sat (Calc) 88.1 H, VBG Base Excess 3.0 H, VBG Potassium 4.1, 

Sodium 140.0, Chloride 103.0, Glucose 95, Lactate 1.2, FiO2 21.0, Venous Blood 

Potassium 4.1


18 09:35: PT 12.4, INR 1.08, APTT 33.8


18 09:35: WBC 9.6, RBC 4.87, Hgb 15.0, Hct 42.9, MCV 88.1, MCH 30.8, MCHC 

35.0, RDW 12.9, Plt Count 290, MPV 10.4, Gran % 67.3, Lymph % (Auto) 25.5, Mono 

% (Auto) 6.2 H, Eos % (Auto) 0.6 L, Baso % (Auto) 0.4, Gran # 6.45, Lymph # 2.4

, Mono # 0.6, Eos # 0.1, Baso # 0.04


18 08:16: Urine Color Yellow, Urine Appearance Clear, Urine pH 6.0, Ur 

Specific Gravity 1.025, Urine Protein Negative, Urine Glucose (UA) Negative, 

Urine Ketones Negative, Urine Blood Small H, Urine Nitrate Negative, Urine 

Bilirubin Negative, Urine Urobilinogen 0.2, Ur Leukocyte Esterase Small H, 

Urine RBC 2 - 5, Urine WBC 5 - 10, Ur Epithelial Cells 4 - 5, Amorphous 

Sediment Few, Urine Bacteria Many, Urine Other Uyeast











Vital Signs











  Temp Pulse Resp BP Pulse Ox


 


 18 09:25   71   120/72 


 


 18 07:42  98.0 F  71  20  120/72 


 


 18 07:27  98.0 F  74  20  98/59 L 


 


 18 23:50  98.0 F  67  20  120/88 


 


 18 21:32   61  18  115/73  96


 


 18 19:11   63  18  111/68  96


 


 18 18:58   68  18  125/69  96


 


 18 16:52   64  18  129/74  96


 


 18 15:05   74  18  132/83  96


 


 18 12:15  98.9 F  61  18  141/82  96


 


 18 09:14  98.9 F  63  18  125/82  97


 


 18 06:09   72  18  130/77  97


 


 18 06:00  97.8 F    

















Current Medications: 





Active Medications











Generic Name Dose Route Start Last Admin





  Trade Name Freq  PRN Reason Stop Dose Admin


 


Acetaminophen  650 mg  18 21:08  





  Tylenol 325mg Tab  PO   





  Q4H PRN   





  Pain, Mild (1-3)   


 


Al Hydrox/Mg Hydrox/Simethicone  30 ml  18 21:08  





  Maalox Plus 30 Ml  PO   





  DAILY PRN   





  Upset Stomach   


 


Aspirin  81 mg  18 08:00  





  Ecotrin  PO   





  DAILY DIMA   


 


Atorvastatin Calcium  20 mg  18 18:00  





  Lipitor  PO   





  Q48H DIMA   


 


Hydrochlorothiazide  25 mg  18 08:00  





  Hydrodiuril  PO   





  DAILY DIMA   


 


Lorazepam  2 mg  18 21:09  





  Ativan  IM   





  Q6 PRN   





  Agitation   





  Protocol   


 


Lorazepam  2 mg  18 21:10  18 23:37





  Ativan  PO   2 mg





  Q6H PRN   Administration





  Anxiety   





  Protocol   


 


Losartan Potassium  100 mg  18 08:00  





  Cozaar  PO   





  DAILY DIMA   


 


Magnesium Hydroxide  30 ml  18 21:08  





  Milk Of Magnesia  PO   





  DAILY PRN   





  Constipation   


 


Metoprolol Tartrate  25 mg  18 08:00  





  Lopressor  PO   





  BID DIMA   


 


Nitrofurantoin Macrocrystals  100 mg  18 09:00  





  Macrobid  PO   





  Q12 DIMA   


 


Sertraline HCl  50 mg  18 22:00  18 22:42





  Zoloft  PO   50 mg





  HS [facility]   Administration


 


Zaleplon  5 mg  18 21:08  





  [place]  PO   





  HS PRN   





  Insomnia   














Past Psychiatric History





- Past Psychiatric History


Previous Treatment History: None


Prior Professional Help: see HPI


Prior Psychiatric Treatment: see HPI


At [facility] hospital: see HPI


Duration: see  HPI


Nature of Treatment: see HPI


Explanation of prior treatment: 





see HPI


History of Abuse: 





denied





History of ETOH/Drug Use: 





denied





History of Family Illness: 





see HPI





Pertinent Medical Hx (Current Medical&Sleep Prob, Allergies): 





 Allergies











Allergy/AdvReac Type Severity Reaction Status Date / Time


 


No Known Allergies Allergy   Verified 18 00:00








 





Metoprolol Tartrate [Lopressor] 25 mg PO BID #60 tab 16 


Losartan/Hydrochlorothiazide [Hyzaar 100-25 Tablet] 1 tab PO DAILY 16 


Aspirin [Ecotrin] 81 mg PO HS 10/05/17 


Atorvastatin [Lipitor] 20 mg PO .EVERY OTHER DAY 10/05/17 


Sertraline [Zoloft] 50 mg PO HS 18 











Review of Systems





- Review of Systems


Systems not reviewed;Unavailable: Acuity of Condition





- EENT


Eyes: As Per HPI


Ears: As Per HPI


Nose/Mouth/Throat: As Per HPI





- Breasts


Breasts: As Per HPI





- Cardiovascular


Cardiovascular: As Per HPI





- Respiratory


Respiratory: As Per HPI





- Gastrointestinal


Gastrointestinal: As Per HPI





- Genitourinary


Genitourinary: As Per HPI





- Reproductive: Female


Reproductive:Female: As Per HPI





- Menstruation


Menstruation: As Per HPI





- Musculoskeletal


Musculoskeletal: As Par HPI





- Integumentary


Integumentary: As Per HPI





- Neurological


Neurological: As Per HPI





- Psychiatric


Psychiatric: As Per HPI





- Endocrine


Endocrine: As Per HPI





- Hematologic/Lymphatic


Hematologic: As Per HPI





Mental Status Examination





- Personal Presentation


Personal Presentation: Looks stated age





- Affect


Affect: Flat, Other (tearful/anxious)





- Motor Activity


Motor Activity: Psychomotor Retardation





- Reliability in Providing Information


Reliability in Providing Information: Fair





- Speech


Speech: Other (circumstantial)





- Mood


Mood: Depressed, Anxious





- Formal Thought Process


Formal Thought Process: No Impairment





- Obsessions/Compulsions


Obsessions: None


Compulsions: None





- Cognitive Functions


Orientation: Person, Place, Situation, Time


Sensorium: Alert


Attention/Concentration: Easily distracted


Abstract Thinking: As evidence by abstract perception of proverbs


Estimate of Intelligence: Below average


Judgement: Intact, as evidence by: Insight regarding need for hospitalization





- Risk


Risk: Self-mutilation, Diminished functioning





- Strength & Assets Inventory


Strength & Assets Inventory: Intelligence, Education, Employment status, 

Employment history, Life experience, Cooperative





- Limitations


Limitations: Living alone, Other (multiple medical problems, poor support)





DSM 5 DX





- DSM 5


DSM 5 Diagnosis: 





r/o MDD with no psychosis


r/o DANILO


r/o panic disorder


r/o mood and anxiety due to GMC








- Recommended/Plan of Treatment


Treatment Recommendations and Plan of Treatment: 


Milieu/structure/supportive therapy 


Medical consult appreciated, see medical team note for more detailed info 


SW consultation for discharge plan and social issues 


Med management 


will continue home meds: 


Metoprolol Tartrate [Lopressor] 25 mg PO BID 


Losartan/Hydrochlorothiazide [Hyzaar 100-25 Tablet] 1 tab PO DAILY


Aspirin [Ecotrin] 81 mg PO HS 


Atorvastatin [Lipitor] 20 mg PO .EVERY OTHER DAY 


Sertraline will be d/c


paxil 5mg po hs for depression and anxiety


vistaril 25mg po bid anxiety


GI consult, possible colonoscopy


medical consult


Family involvement 


Follow up on labs 


Will monitor closely 


Pt was educated about risk/benefits and alternatives of medications, coping 

strategies (safety plan, suicide prevention), relapse prevention, importance of 

follow up with psychiatrist and therapist, stay away from drugs/alcohol/smoking





Projected ELOS: 4days 


Prognosis: fair


Discharge Plan and Discharge Criteria: 


Pt will be not depressed or manic, will be more hopeful, will be not psychotic 

or anxious, will be not having thoughts of harming self or others, will be 

tolerating medications well, will not have major side effects, will be able to 

function, will not pose threat to self or others.








- Smoking Cessation


Smoking Cessation Initiated: No


Reason for not providing: pt denied smoking

## 2018-01-24 NOTE — PCM.BM
<Faraz Quesada - Last Filed: 01/24/18 00:48>





Treatment Plan Problems





- Problems identified on initial assessmt


  ** DEBILITATING ANXIETY


Date Initiated: 01/24/18


Time Initiated: 23:00


Assessment reference: NA


Status: Active





  ** DEPRESSION


Date Initiated: 01/23/18





Treatment assets and liabiliti


Patient Assests: adapts well, cooperative, educated, insightful, self-reliant, 

ADL independent, negotiates basic needs, cognitively intact


Patient Liabilities: live alone, financial problems, medical problems





- Milieu Protocol


Maintain good personal hygiene: every other day Encourage regular showers, 

every shift Remind patient to perform daily oral care, every shift Assist 

patient to perform ADL's


Maintain personal safety: every shift Educate patient to report safety concerns 

to staff, every shift Monitor environment for contraband/sharps


Medication safety: Monitor for expected outcome, potential side effects: every 

shift, Assess barriers to learning: every shift, Assess readiness for 

medication education: every shift





Family Contact


Family involvement: Family/SO is involved





Discharge/Continuing Care





- Education Needs


Education Needs: Patient Medication, Patient Diagnosis/Disease Process, Patient 

Coping Skills, Patient Activities of Daily Living, Patient Pain, Patient 

Nutrition, Patient Health Practices/Safety, Patient Aftercare Safety Plan





- Discharge


Discharge Criteria: Tolerates medication w/o severe side effects, Free of 

Suicidal thoughts, Normal sleep pattern, Ability to care for self





<Sammi Fontenot Y - Last Filed: 01/24/18 12:49>





Family Contact


Family contact: Patient agrees to contact


Family contact name: Alice Pérez(sister) [phone]


Family contacted how many times per week?: 2





- Outside Agency


  ** Dr[facility] Gale[facility]


Care involvment: Not involved





<Aminata Flores - Last Filed: 01/24/18 14:53>





- Diagnosis


(1) MDD (major depressive disorder)


Status: Acute   


Interventions: 





01/24/18 14:52


Psychoeducation


Psychopharmacology/adjustment of medications as needed/ monitoring possible 

side effects


Evaluate pt on daily basis


Compliance with medications and follow up appointments


Suicide and homicide risk assessment and prevention


Relapse prevention


Reduction of symptoms


Improve functional status


Family involvement


As outpatient: cognitive behavioral therapy








(2) DANILO (generalized anxiety disorder)


Status: Acute   


Interventions: 





01/24/18 14:52


Psychoeducation


Psychopharmacology/adjustment of medications as needed/ monitoring possible 

side effects


Evaluate pt on daily basis


Discussion of importance of being compliant with medications and follow up 

appointments


Suicide and homicide risk assessment and prevention, coping strategies, safety 

plan


Reduction of symptoms


Relaxation techniques and breathing exercises 


Improve functional status


Family involvement


Cognitive behavioral therapy as outpatient

## 2018-01-24 NOTE — CP.PCM.CON
<Samira Painter - Last Filed: 01/24/18 10:36>





History of Present Illness





- History of Present Illness


History of Present Illness: 


Gastroenterology Fellow/PGY5 Consult Note





52 year old male with history of Hypertension, Hyperlipidemia, and Diabetes 

currently in behavioral health on psych unit for anxiety. GI consultation for 

dyspepsia and bright red blood per rectum. Patient admits to eating take out 

which likely included tomato, garlic,and onions followed by a "weird sensation" 

in her chest and upper stomach area likely related to indigestion and 

heartburn. Admits to intermittent nausea without vomiting. No prior PPI use. 

Admits to intermittent hard stools every 1-2 days with small amounts of BRBPR 

with wiping and history of anal fissure in the past. Denies hematemesis, chest 

pain, shortness of breath, diaphoresis, chills, sweats, fevers, diarrhea, melena

, sick contacts, recent travel, recent antibiotics, yellowing of her eye, or 

skin, or unintentional weight loss. No prior EGD or colonoscopy.





Family- mother- lung cancer; denies stomach cancer, colon cancer


Social- social alcohol use, denies tobacco or illicit drug use


Surgery- none





Review of Systems





- Review of Systems


Review of Systems: 


12-point review of systems negative except for as above








Past Patient History





- Past Social History


Smoking Status: Never Smoked





- CARDIAC


Hx Cardiac Disorders: Yes (hyperlipidemia)


Hx Hypertension: Yes





- PULMONARY


Hx Respiratory Disorders: No





- NEUROLOGICAL


Hx Neurological Disorder: No





- HEENT


Hx HEENT Problems: No





- RENAL


Hx Chronic Kidney Disease: No





- ENDOCRINE/METABOLIC


Hx Endocrine Disorders: No





- HEMATOLOGICAL/ONCOLOGICAL


Hx Blood Disorders: No





- INTEGUMENTARY


Hx Dermatological Problems: No





- MUSCULOSKELETAL/RHEUMATOLOGICAL


Hx Musculoskeletal Disorders: No


Hx Arthritis:  (joint pains)


Hx Falls: No





- GASTROINTESTINAL


Hx Gall Bladder Disease: Yes (cholelitiasis)





- GENITOURINARY/GYNECOLOGICAL


Hx Genitourinary Disorders: No





- PSYCHIATRIC


Hx Psychophysiologic Disorder: Yes


Hx Anxiety: Yes


Hx Depression: Yes


Hx Substance Use: No





- SURGICAL HISTORY


Hx Surgeries: No





Meds


Allergies/Adverse Reactions: 


 Allergies











Allergy/AdvReac Type Severity Reaction Status Date / Time


 


No Known Allergies Allergy   Verified 01/24/18 00:00














- Medications


Medications: 


 Current Medications





Acetaminophen (Tylenol 325mg Tab)  650 mg PO Q4H PRN


   PRN Reason: Pain, Mild (1-3)


Al Hydrox/Mg Hydrox/Simethicone (Maalox Plus 30 Ml)  30 ml PO DAILY PRN


   PRN Reason: Upset Stomach


Aspirin (Ecotrin)  81 mg PO DAILY [facility]


Atorvastatin Calcium (Lipitor)  20 mg PO Q48H [facility]


Hydrochlorothiazide (Hydrodiuril)  25 mg PO DAILY [facility]


   Last Admin: 01/24/18 09:26 Dose:  25 mg


Lorazepam (Ativan)  2 mg IM Q6 PRN; Protocol


   PRN Reason: Agitation


Lorazepam (Ativan)  2 mg PO Q6H PRN; Protocol


   PRN Reason: Anxiety


   Last Admin: 01/23/18 23:37 Dose:  2 mg


Losartan Potassium (Cozaar)  100 mg PO DAILY [facility]


   Last Admin: 01/24/18 09:21 Dose:  100 mg


Magnesium Hydroxide (Milk Of Magnesia)  30 ml PO DAILY PRN


   PRN Reason: Constipation


Metoprolol Tartrate (Lopressor)  25 mg PO BID [facility]


   Last Admin: 01/24/18 09:25 Dose:  25 mg


Nitrofurantoin Macrocrystals (Macrobid)  100 mg PO Q12 [facility]


   Last Admin: 01/24/18 09:33 Dose:  100 mg


Sertraline HCl (Zoloft)  50 mg PO HS [facility]


   Last Admin: 01/23/18 22:42 Dose:  50 mg


Zaleplon (Sonata)  5 mg PO HS PRN


   PRN Reason: Insomnia











Physical Exam





- Constitutional


Appears: Non-toxic, No Acute Distress





- Head Exam


Head Exam: ATRAUMATIC, NORMOCEPHALIC





- Eye Exam


Eye Exam: EOMI, PERRL


Pupil Exam: PERRL.  absent: Miosis, Mydriatic





- ENT Exam


ENT Exam: Mucous Membranes Moist, Normal Oropharynx





- Neck Exam


Neck exam: Positive for: Full Rom, Normal Inspection





- Respiratory Exam


Respiratory Exam: Clear to Auscultation Bilateral.  absent: Rales, Rhonchi, 

Wheezes





- Cardiovascular Exam


Cardiovascular Exam: RRR, +S1, +S2.  absent: Gallop, Rubs





- GI/Abdominal Exam


GI & Abdominal Exam: Normal Bowel Sounds, Soft.  absent: Distended, Firm, 

Guarding, Organomegaly, Rigid, Tenderness





- Extremities Exam


Extremities exam: Positive for: normal inspection.  Negative for: pedal edema





- Neurological Exam


Neurological exam: Alert





- Psychiatric Exam


Psychiatric exam: Anxious, Depressed, Flat Affect





- Skin


Skin Exam: Dry, Intact, Normal Color, Warm





Results





- Vital Signs


Recent Vital Signs: 


 Last Vital Signs











Temp  98.0 F   01/24/18 07:42


 


Pulse  71   01/24/18 09:25


 


Resp  20   01/24/18 07:42


 


BP  120/72   01/24/18 09:25


 


Pulse Ox  96   01/23/18 21:32














- Labs


Result Diagrams: 


 01/23/18 09:35





 01/23/18 09:35


Labs: 


 Laboratory Results - last 24 hr











  01/24/18 01/24/18





  07:45 07:45


 


Fasting Glucose  105 


 


Triglycerides  99 


 


Cholesterol  177 


 


LDL Cholesterol Direct  116 


 


HDL Cholesterol  38 


 


TSH 3rd Generation   0.85














Assessment & Plan





- Assessment and Plan (Free Text)


Assessment: 


52 year old male with history of Hypertension, Hyperlipidemia, and Diabetes 

currently in behavioral health on psych unit for depression/anxiety. GI 

consultation for heartburn and bright red blood per rectum likely 2/2 dyspepsia 

2/2 food triggers and constipation. No prior EGD or colonoscopy.








Plan: 





>start PPI 30 minutes before breakfast for 6-8 week trial


>Miralax daily and Colace BID 


>Prep-H RC BID


>heart healthy diet- avoid acidic based foods


>small, frequent, low fat meals in setting of history of Diabetes


>lifestyle modifications- sit upright after meals


>U/S- cholelithiasis, no prior imaging to compare


>normal LFTs, afebrile


>normal stress test 4/2016


>normal LV EF function with Grade1 diastolic dysfunction on ECHO 3/2016


>if symptoms don't improve after 6-8 PPI trial consider EGD for further 

evaluation


>recommend elective outpatient  colonoscopy for CRC screening and BRBPR to 

ensure no other cause with history of anal fissure





<[facility] - Last Filed: 01/24/18 12:55>





Meds





- Medications


Medications: 


 Current Medications





Acetaminophen (Tylenol 325mg Tab)  650 mg PO Q4H PRN


   PRN Reason: Pain, Mild (1-3)


Al Hydrox/Mg Hydrox/Simethicone (Maalox Plus 30 Ml)  30 ml PO DAILY PRN


   PRN Reason: Upset Stomach


Aspirin (Ecotrin)  81 mg PO DAILY [facility]


Atorvastatin Calcium (Lipitor)  20 mg PO Q48H [facility]


Hydrochlorothiazide (Hydrodiuril)  25 mg PO DAILY [facility]


   Last Admin: 01/24/18 09:26 Dose:  25 mg


Hydrocortisone (Anusol-Hc)  0 gm AL BID [facility]


Hydroxyzine Pamoate (Vistaril)  25 mg PO BID [facility]


   PRN Reason: Protocol


Lorazepam (Ativan)  2 mg IM Q6 PRN; Protocol


   PRN Reason: Agitation


Lorazepam (Ativan)  2 mg PO Q6H PRN; Protocol


   PRN Reason: Anxiety


   Last Admin: 01/23/18 23:37 Dose:  2 mg


Losartan Potassium (Cozaar)  100 mg PO DAILY [facility]


   Last Admin: 01/24/18 09:21 Dose:  100 mg


Magnesium Hydroxide (Milk Of Magnesia)  30 ml PO DAILY PRN


   PRN Reason: Constipation


Metoprolol Tartrate (Lopressor)  25 mg PO BID [facility]


   Last Admin: 01/24/18 09:25 Dose:  25 mg


Nitrofurantoin Macrocrystals (Macrobid)  100 mg PO Q12 [facility]


   Last Admin: 01/24/18 09:33 Dose:  100 mg


Pantoprazole Sodium (Protonix Ec Tab)  40 mg PO 0600 [facility]


Paroxetine HCl (Paxil)  5 mg PO HS [facility]


Polyethylene Glycol (Miralax)  17 gm PO DAILY [facility]


Sertraline HCl (Zoloft)  50 mg PO HS [facility]


   Last Admin: 01/23/18 22:42 Dose:  50 mg


Zaleplon (Sonata)  5 mg PO HS PRN


   PRN Reason: Insomnia











Results





- Vital Signs


Recent Vital Signs: 


 Last Vital Signs











Temp  98.0 F   01/24/18 07:42


 


Pulse  71   01/24/18 09:25


 


Resp  20   01/24/18 07:42


 


BP  120/72   01/24/18 09:25


 


Pulse Ox  96   01/23/18 21:32














- Labs


Result Diagrams: 


 01/23/18 09:35





 01/23/18 09:35


Labs: 


 Laboratory Results - last 24 hr











  01/24/18 01/24/18





  07:45 07:45


 


Fasting Glucose  105 


 


Triglycerides  99 


 


Cholesterol  177 


 


LDL Cholesterol Direct  116 


 


HDL Cholesterol  38 


 


TSH 3rd Generation   0.85














Attending/Attestation





- Attestation


I have personally seen and examined this patient.: Yes


I have fully participated in the care of the patient.: Yes


I have reviewed all pertinent clinical information: Yes


Notes (Text): 





01/24/18 12:52


This is a 52 year old female with history of Hypertension, Hyperlipidemia, and 

Diabetes currently in behavioral health on psych unit for depression/anxiety. 

GI consultation for contipation and history of anal fissure treated in 2016 by 

[facility] in 2016. She is scheduled for colonoscopy on 2/3/18. Denoes 

rectal pain. Has intermittent hard bowel movements for which will prescribe 

miralax daily. She denies GERD, heartburn or epigastric pain or dark stools. 

Wants to follow with non [facility] as scheduled due to insurance issues. 

Thank you for letting us participate in the care of your patient

## 2018-01-25 VITALS — DIASTOLIC BLOOD PRESSURE: 67 MMHG | SYSTOLIC BLOOD PRESSURE: 109 MMHG | HEART RATE: 72 BPM

## 2018-01-25 VITALS — TEMPERATURE: 98.1 F

## 2018-01-25 LAB
ALBUMIN SERPL-MCNC: 3.9 G/DL (ref 3–4.8)
ALBUMIN/GLOB SERPL: 1.3 {RATIO} (ref 1.1–1.8)
ALT SERPL-CCNC: 45 U/L (ref 7–56)
AST SERPL-CCNC: 33 U/L (ref 14–36)
BUN SERPL-MCNC: 15 MG/DL (ref 7–21)
CALCIUM SERPL-MCNC: 10.3 MG/DL (ref 8.4–10.5)
ERYTHROCYTE [DISTWIDTH] IN BLOOD BY AUTOMATED COUNT: 13.3 % (ref 11.5–14.5)
GFR NON-AFRICAN AMERICAN: > 60
HGB BLD-MCNC: 13.9 G/DL (ref 12–16)
MCH RBC QN AUTO: 30.3 PG (ref 25–35)
MCHC RBC AUTO-ENTMCNC: 33.7 G/DL (ref 31–37)
MCV RBC AUTO: 90.2 FL (ref 80–105)
PLATELET # BLD: 257 10^3/UL (ref 120–450)
PMV BLD AUTO: 10.3 FL (ref 7–11)
RBC # BLD AUTO: 4.58 10^6/UL (ref 3.5–6.1)
WBC # BLD AUTO: 6.8 10^3/UL (ref 4.5–11)

## 2018-01-25 RX ADMIN — HYDROCORTISONE SCH: 25 CREAM TOPICAL at 16:37

## 2018-01-25 RX ADMIN — PANTOPRAZOLE SODIUM SCH MG: 40 TABLET, DELAYED RELEASE ORAL at 06:53

## 2018-01-25 RX ADMIN — POLYETHYLENE GLYCOL 3350 SCH GM: 17 POWDER, FOR SOLUTION ORAL at 09:40

## 2018-01-25 RX ADMIN — HYDROCORTISONE SCH: 25 CREAM TOPICAL at 09:39

## 2018-01-25 NOTE — PCM.PYCHPN
Psychiatric Progress Note





- Psychiatric Progress Note


Patient seen today, length of contact: 30min


Patient Chief Complaint: 





"I slept little better, I still feel anxious and shaky"


Problems Identified/Issues Discussed: 


Suicide/ homicide prevention, past psychiatric h/o, current psychiatric symptoms

, medical problems, risk/benefits and alternatives of medications, medications 

compliance, coping strategies, substance abuse h/o, relapse prevention, 

importance of follow up with psychiatrist and therapist, discharge plan. 





Medical Problems: 


HTN, GI fistula, dyslipidemia


Diagnostic Results: 














 01/25/18 07:45 





 01/25/18 07:45 





 Lab Results





01/25/18 07:45: Sodium 141, Chloride 103, Potassium 4.5, Carbon Dioxide 28, 

Anion Gap 15, BUN 15, Creatinine 0.7, Est GFR (African Amer) > 60, Est GFR (Non-

Af Amer) > 60, Random Glucose 100, Calcium 10.3, Total Bilirubin 0.7, AST 33, 

ALT 45, Alkaline Phosphatase 53, Total Protein 7.0, Albumin 3.9, Globulin 3.1, 

Albumin/Globulin Ratio 1.3


01/25/18 07:45: WBC 6.8  D, RBC 4.58, Hgb 13.9, Hct 41.3, MCV 90.2, MCH 30.3, 

MCHC 33.7, RDW 13.3, Plt Count 257, MPV 10.3


01/24/18 07:45: RPR Nonreactive


01/24/18 07:45: TSH 3rd Generation 0.85


01/24/18 07:45: Fasting Glucose 105, Triglycerides 99, Cholesterol 177, LDL 

Cholesterol Direct 116, HDL Cholesterol 38


01/23/18 09:35: Sodium 142, Chloride 104, Potassium 3.7, Carbon Dioxide 26, 

Anion Gap 15, BUN 11, Creatinine 0.6 L, Est GFR ( Amer) > 60, Est GFR (

Non-Af Amer) > 60, Random Glucose 95, Calcium 10.3, Total Bilirubin 0.7, AST 26

, ALT 39, Alkaline Phosphatase 63, Lactate Dehydrogenase 413, Total Creatine 

Kinase 46, Troponin I < 0.01, Total Protein 7.7, Albumin 4.4, Globulin 3.3, 

Albumin/Globulin Ratio 1.3, Lipase 165


01/23/18 09:35: pO2 45, VBG pH 7.40, VBG pCO2 46.0, VBG HCO3 28.5 H, VBG Total 

CO2 29.9 H, VBG O2 Sat (Calc) 88.1 H, VBG Base Excess 3.0 H, VBG Potassium 4.1, 

Sodium 140.0, Chloride 103.0, Glucose 95, Lactate 1.2, FiO2 21.0, Venous Blood 

Potassium 4.1


01/23/18 09:35: PT 12.4, INR 1.08, APTT 33.8


01/23/18 09:35: WBC 9.6, RBC 4.87, Hgb 15.0, Hct 42.9, MCV 88.1, MCH 30.8, MCHC 

35.0, RDW 12.9, Plt Count 290, MPV 10.4, Gran % 67.3, Lymph % (Auto) 25.5, Mono 

% (Auto) 6.2 H, Eos % (Auto) 0.6 L, Baso % (Auto) 0.4, Gran # 6.45, Lymph # 2.4

, Mono # 0.6, Eos # 0.1, Baso # 0.04


01/23/18 08:16: Urine Color Yellow, Urine Appearance Clear, Urine pH 6.0, Ur 

Specific Gravity 1.025, Urine Protein Negative, Urine Glucose (UA) Negative, 

Urine Ketones Negative, Urine Blood Small H, Urine Nitrate Negative, Urine 

Bilirubin Negative, Urine Urobilinogen 0.2, Ur Leukocyte Esterase Small H, 

Urine RBC 2 - 5, Urine WBC 5 - 10, Ur Epithelial Cells 4 - 5, Amorphous 

Sediment Few, Urine Bacteria Many, Urine Other Uyeast











Vital Signs











  Temp Pulse Resp BP Pulse Ox


 


 01/25/18 07:41  98.1 F  60  20  


 


 01/24/18 18:01   70   108/72 


 


 01/24/18 15:00   70   108/72 


 


 01/24/18 09:25   71   120/72 


 


 01/24/18 07:42  98.0 F  71  20  120/72 


 


 01/24/18 07:27  98.0 F  74  20  98/59 L 


 


 01/23/18 23:50  98.0 F  67  20  120/88 


 


 01/23/18 21:32   61  18  115/73  96


 


 01/23/18 19:11   63  18  111/68  96


 


 01/23/18 18:58   68  18  125/69  96


 


 01/23/18 16:52   64  18  129/74  96


 


 01/23/18 15:05   74  18  132/83  96


 


 01/23/18 12:15  98.9 F  61  18  141/82  96


 


 01/23/18 09:14  98.9 F  63  18  125/82  97


 


 01/23/18 06:09   72  18  130/77  97


 


 01/23/18 06:00  97.8 F    











DSM 5 Symptoms Update: 


shortly patient is 52 year old  female, self reported a history 

depression and anxiety, denied previous psychiatric admissions, denied h/o 

suicidal attempts, pt has multiple medical problems including HTN, GI fistula, 

dyslipidemia, strong family h/o mental illness of schizophrenia and bipolar 

spectrum, came to the hospital for abdominal discomfort, ED physician was 

concerned about pt's presentation, pt was anxious, tearful and it was the 

second visit to ED for the past week (1/18/18 and 1/23/18) for the similar 

symptoms and psychiatric evaluation was called. during the initial evaluation 

at ED by [facility] pt was found to be very anxious, difficulties to concentrate, 

depressed, tearful, pt mentioned that unintentionally she lost 20 Lb for the 

past two months, was refusing to have medical tests done (colonoscopy), pt had 

scheduled appt with Dr.Paul Tomlinson (local psychiatrist)1/24/18, but was not able 

to wait till the morning time to see the psychiatrist and requested psych 

admission. 





pt was seen today at the morning at the treatment team meeting room, pt seems 

to be less anxious, affect is more reactive. 





pt said that she slept better last night, but still feels "very anxious and 

shaky".





pt deems to socialize with others better. 





as per staff pt at times tearful, overall improving. 





pt tolerates meds well, no side effects observed or reported, AIMS 0, no EPS. 





Impression:


DSM 5 Diagnosis: 





r/o MDD with no psychosis


r/o DANILO


r/o panic disorder


r/o mood and anxiety due to [facility]








Medication Change: Yes (paxil increased)


Medical Record Reviewed: Yes


Consults ordered or reviewed: 





medical consult 


cardiology consult 


GI consult





all appreciated








Mental Status Examination





- Cognitive Function


Orientation: Person, Place, Situation, Time


Memory: Intact


Attention: Poor


Concentration: Poor


Association: WNL


Fund of Knowledge: WNL





- Mood


Mood: Depressed, Anxious





- Affect


Affect: Flat, Other (tearful/anxious)





- Formal Thought Process


Formal Thought Process: No Impairment





- Suicidal Ideation


Suicidal Ideation: No





- Homicidal Ideation


Homicidal Ideation: No





Goal/Treatment Plan





- Goal/Treatment Plan


Need for Continued Stay: Remain at risks for inpatient hospitalization, Severe 

depression anxiety, Discharge may exacerbated symptoms, Severe functional 

impairment


Progress Toward Problem(s) and Goals/Treatment Plan: 


Milieu/structure/supportive therapy 


Medical consult appreciated, see medical team note for more detailed info 


SW consultation for discharge plan and social issues 


Med management 


will continue home meds: 


Metoprolol Tartrate [Lopressor] 25 mg PO BID 


Losartan/Hydrochlorothiazide [Hyzaar 100-25 Tablet] 1 tab PO DAILY


Aspirin [Ecotrin] 81 mg PO HS 


Atorvastatin [Lipitor] 20 mg PO .EVERY OTHER DAY 


Sertraline d/c


paxil 10mg po hs for depression and anxiety


vistaril 25mg po bid anxiety


GI consult, possible colonoscopy


medical consult


Family involvement 


Follow up on labs 


Will monitor closely 


Pt was educated about risk/benefits and alternatives of medications, coping 

strategies (safety plan, suicide prevention), relapse prevention, importance of 

follow up with psychiatrist and therapist, stay away from drugs/alcohol/smoking





Estimated Date of D/C: 01/26/18

## 2018-01-25 NOTE — CON
DATE:  01/24/2018



HISTORY OF PRESENT ILLNESS:  I saw her in the psychiatric floor, resting

comfortably in bed.  She is a 52-year-old female who had some abdominal

pain and nauseousness.  She had similar symptoms in the past.  She did have

a CAT scan in the past and she is just not feeling well.  She has a past

medical history of hypertension, hyperlipidemia, diabetes, anxiety.  She is

fairly anxious at this time.  She had high cholesterol, hypertension,

bilateral leg edema.  She has joint pains, anxiety, depression.



FAMILY HISTORY:  No known family history.



SOCIAL HISTORY:   Never smoked.  No alcohol.  No drugs.



ALLERGIES:  NO KNOWN DRUG ALLERGIES.



MEDICATIONS:  She takes Hyzaar, Ecotrin, Lipitor, Zoloft.



REVIEW OF SYSTEMS:  No fevers.  No vision changes.  No hearing changes.  No

sore throat.  No shortness of breath.  No chest pain.  There is abdominal

pain and nausea.  No vomiting.  No diarrhea.  She has back pain.  No neck

pain.  No headache or dizziness.



PHYSICAL EXAMINATION:

GENERAL:  She is alert and oriented x3, well appearing, sitting up in bed. 

She is complaining of right-sided abdominal pain.

VITAL SIGNS:  She has a 98.9 temperature, 61 pulse, 18 respiratory rate,

141/82 blood pressure, 96% O2 sat.

HEENT:  Head is atraumatic, normocephalic.  Pupils are reactive to light. 

Extraocular muscles are intact.  Throat is moist.

NECK:  Supple.

HEART:  Regular rate.  Normal S1 and S2.

LUNGS:  Clear to auscultation bilaterally.  No wheezes or rhonchi.

ABDOMEN:  Mild tenderness.  Normal bowel sounds.  No guarding.  No rebound.

No CVA tenderness.

EXTREMITIES:  Have no edema at this time.

NEUROLOGIC:  GCS is 15.  Cranial nerves II-XII grossly intact.  Speech is

normal.  Alert and oriented x3.

SKIN:  Warm and dry.  No apparent rashes or ulcers.

LYMPH NODES:  Thyroid midline.  No apparent lymphadenopathy.



She has abdominal pain radiating to the back, make sure this is not a

gallbladder issue.



LABORATORY DATA:  She had multiple tests.  She had urine, which has many

bacteria shows the UTI.  I put her on some Macrobid.  Sodium 142, potassium

3.7, BUN 11, creatinine 0.6, GFR is greater than 60, sugar is 95, calcium

is 7.8, total bili is 0.7.  AST is 26, ALT is 39, alkaline phosphatase 53. 

Lactate dehydrogenase is 413.  Total creatinine kinase is 46, troponin I is

less than 0.01, total protein 7.7, albumin is 4.4.  Sugars had been 99,

cholesterol 177.  LDL is 116, lipase is 165, lactate is 1.2, 1.08 INR. 

White count is 9.6, hemoglobin 15, hematocrit 42.9, platelets are 290,000.



Chest x-ray, no active disease.  EKG showed normal sinus rhythm, inferior

infarct, age indeterminate.



ASSESSMENT AND PLAN:  I will get cardiologist's opinion.  She also had an

ultrasound of the abdomen, which showed cholelithiasis without sonographic

evidence of cholecystitis.  I will consult GI for the abdominal pains,

gallstones also.; Dr. Mccann, Cardiology for the EKG changes.  She is now

anxious and depressed.  She will be in Psychology floor.  We will check her

labs tomorrow.  We will continue with aggressive treatment and care and

will follow along.







__________________________________________

Jerry Keller DO



DD:  01/24/2018 8:57:35

DT:  01/24/2018 9:40:25

Job # [id number]

## 2018-01-25 NOTE — CON
DATE:  01/24/2018



CARDIOLOGY CONSULTATION



HISTORY OF PRESENT ILLNESS:  The patient is a 53-year-old woman admitted

for depression with psych evaluation.



I was asked to see her because EKG computer read with an old inferior wall

MI.



Patient's past medical history is notable for hypertension and

hypercholesterolemia.



She was evaluated at [facility] where she underwent a

stress echo.  The stress echo was reported to be unremarkable.



She denies chest pain, denies shortness of breath.



SOCIAL HISTORY:  She denies smoking.



REVIEW OF SYSTEMS: A 14-point review of systems is reviewed in detail.  No

cardiac symptomatology is noted.



PHYSICAL EXAMINATION:

VITAL SIGNS:  The blood pressure is 120/72, the heart rate is in the 70s.

NECK:  Negative JVD.

LUNGS:  Without rales.

HEART:  Reveals S1, S2.

EXTREMITIES:  Without edema.



EKG shows Qs in the inferior leads, which more likely is due to her

rightward axis deviation.  Troponin is negative x1.  The rest for her labs

is unremarkable.



IMPRESSION:

1.  Depression.

2.  Hypertension.

3.  Hypercholesterolemia.

4.  EKG, which shows Qs in the inferior leads does not represent an

inferior wall myocardial infarction.  She has had multiple cardiac testing,

which does not show evidence for an inferior wall myocardial infarction.



The Qs in the inferior leads are likely due to her rightward axis

deviation.  No further cardiac workup is necessary.





__________________________________________

Weston Mccann MD





DD:  01/24/2018 15:51:04

DT:  01/24/2018 15:54:11

Job # [id number]

## 2018-01-25 NOTE — PN
SUBJECTIVE:  I saw her resting comfortably in her room.  She is doing okay.

She is here for a few reasons, anxiety and depression.  She also has UTI

and nauseousness.  She had EKG changes.  She was seen by the cardiologist

and it is okay.  She has high cholesterol, diabetes, anemia, but she is a

little bit better overall.



OBJECTIVE:

VITAL SIGNS:  She has a 98.1 temperature, 60 pulse, 20 respiratory rate.

HEENT:  Head is atraumatic, normocephalic.

HEART:  Regular rate.

LUNGS:  Clear to auscultation.

ABDOMEN:  Soft, obese, nontender.

EXTREMITIES:  No edema.



MEDICATIONS:  She is currently on Anusol, Ativan, Colace, Cozaar, Ecotrin,

HydroDIURIL, Lipitor, Lopressor, Maalox, Macrobid for UTI, milk of

magnesia, MiraLax for constipation, Paxil, Protonix, Sonata, Tylenol, and

Vistaril.



LABORATORY DATA:  She had a blood test with 6.8 white count, 13.9

hemoglobin, 41.3 hematocrit with 257 platelets.  She has 141 sodium,

potassium 4.5, BUN 0.7, GFR is greater than 60, sugar is 100,calcium is

10.3, total bilirubin is 0.7, AST is 33, ALT is 45, alkaline phosphatase is

53, troponin I was less than 0.01, total protein 7, TSH is 0.85.  Urine was

positive, she is on antibiotics.



ASSESSMENT AND PLAN:  The patient got nausea, vomiting, urinary tract

infection, gallbladder disease, EKG changes, hypertension, high

cholesterol, diabetes, anemia, anxiety, and depression.  She is being seen

by Cardiology, Gastroenterology, and Psychiatry.  We had a very good

thorough evaluation.  We will continue with treatment and care.  We will

follow.







__________________________________________

Jerry Keller DO



DD:  01/25/2018 11:41:58

DT:  01/25/2018 11:44:31

Job # [id number]

## 2018-01-26 RX ADMIN — HYDROCORTISONE SCH: 25 CREAM TOPICAL at 11:10

## 2018-01-26 RX ADMIN — PANTOPRAZOLE SODIUM SCH MG: 40 TABLET, DELAYED RELEASE ORAL at 11:00

## 2018-01-26 RX ADMIN — POLYETHYLENE GLYCOL 3350 SCH: 17 POWDER, FOR SOLUTION ORAL at 11:09

## 2018-01-26 NOTE — PN
DATE:



SUBJECTIVE:  I saw her in the psychiatric floor.  She is resting

comfortably in bed.  She tells me she is going home today.  She is on

Anusol, Ativan, Colace, Cozaar, Ecotrin, HydroDIURIL, Lipitor, Lopressor,

Maalox, Macrobid, milk of magnesia, MiraLax, Paxil, Protonix, Tylenol, and

Vistaril.  She has anxiety, depression, and UTI.  She was nauseous.  If she

does go today, she will need at least 4 more days of the medicine

antibiotic Macrobid for her urinary tract infection.



PHYSICAL EXAMINATION:

VITAL SIGNS:  She has 98.1 temperature, 72 pulse, 109/67 blood pressure,

and 20 respiratory rate.

HEENT:  Head is atraumatic, normocephalic.

HEART:  Regular rate.

LUNGS:  Clear to auscultation.

ABDOMEN:  Soft.

EXTREMITIES:  No edema.



LABORATORY DATA:  Labs  from the 25th, CBC was very good.  SMA-20 was

very good.



ASSESSMENT AND PLAN:  She has been seen by Cardiology and GI and they said

she is okay and Psychiatry, hopefully she will improve and if she is here

tomorrow, I will see her as per Psychiatry.





__________________________________________

Jerry Keller DO





DD:  01/26/2018 8:28:01

DT:  01/26/2018 10:33:25

Job # [id number]



MTDD

## 2018-01-26 NOTE — PCM.PYCHDC
Mental Status Examination





- Mental Status Examination


Orientation: Person, Place, Situation, Time


Memory: Intact


Mood: Neutral


Affect: Constricted (but reactive and mood congruent)


Speech: Appropriate


Attention: WNL


Concentration: WNL


Association: WNL


Fund of Knowledge: WNL


Formal Thought Process: No Impairment


Description of patient's judgement and insight: 


Pt has improved insight into mental and medical illness, pt was compliant with 

medications and unit rules and regulations, pt was going to groups, was calm, 

cooperative, socially appropriate, no behavioral incidents, no agitation, no 

aggression.








Psychotic Thoughts and Behaviors: 





Pt denied v/a/t hallucinations, denied paranoid ideations, pt does not appear 

to be psychotic, and thought process is goal directed. 





Suicidal Ideation: No


Current Homicidal Ideation?: No


Plan: 


pt adamantly denied thoughts of harming self or others denied intent or plan.








Discharge Summary





- Discharge Note


Reason for Hospitalization: 





pt was admitted for evaluation of worsening of depression, worsening anxiety, 

inability of function. 


Psychiatric History (includes Medical, Family, Personal Hx): see HPI


Laboratory Data: 














 18 07:45 





 18 07:45 





 Lab Results





18 07:45: Sodium 141, Chloride 103, Potassium 4.5, Carbon Dioxide 28, 

Anion Gap 15, BUN 15, Creatinine 0.7, Est GFR (African Amer) > 60, Est GFR (Non-

Af Amer) > 60, Random Glucose 100, Calcium 10.3, Total Bilirubin 0.7, AST 33, 

ALT 45, Alkaline Phosphatase 53, Total Protein 7.0, Albumin 3.9, Globulin 3.1, 

Albumin/Globulin Ratio 1.3


18 07:45: WBC 6.8  D, RBC 4.58, Hgb 13.9, Hct 41.3, MCV 90.2, MCH 30.3, 

MCHC 33.7, RDW 13.3, Plt Count 257, MPV 10.3


18 07:45: RPR Nonreactive


18 07:45: TSH 3rd Generation 0.85


18 07:45: Fasting Glucose 105, Triglycerides 99, Cholesterol 177, LDL 

Cholesterol Direct 116, HDL Cholesterol 38


18 09:35: Sodium 142, Chloride 104, Potassium 3.7, Carbon Dioxide 26, 

Anion Gap 15, BUN 11, Creatinine 0.6 L, Est GFR ( Amer) > 60, Est GFR (

Non-Af Amer) > 60, Random Glucose 95, Calcium 10.3, Total Bilirubin 0.7, AST 26

, ALT 39, Alkaline Phosphatase 63, Lactate Dehydrogenase 413, Total Creatine 

Kinase 46, Troponin I < 0.01, Total Protein 7.7, Albumin 4.4, Globulin 3.3, 

Albumin/Globulin Ratio 1.3, Lipase 165


18 09:35: pO2 45, VBG pH 7.40, VBG pCO2 46.0, VBG HCO3 28.5 H, VBG Total 

CO2 29.9 H, VBG O2 Sat (Calc) 88.1 H, VBG Base Excess 3.0 H, VBG Potassium 4.1, 

Sodium 140.0, Chloride 103.0, Glucose 95, Lactate 1.2, FiO2 21.0, Venous Blood 

Potassium 4.1


18 09:35: PT 12.4, INR 1.08, APTT 33.8


18 09:35: WBC 9.6, RBC 4.87, Hgb 15.0, Hct 42.9, MCV 88.1, MCH 30.8, MCHC 

35.0, RDW 12.9, Plt Count 290, MPV 10.4, Gran % 67.3, Lymph % (Auto) 25.5, Mono 

% (Auto) 6.2 H, Eos % (Auto) 0.6 L, Baso % (Auto) 0.4, Gran # 6.45, Lymph # 2.4

, Mono # 0.6, Eos # 0.1, Baso # 0.04


18 08:16: Urine Color Yellow, Urine Appearance Clear, Urine pH 6.0, Ur 

Specific Gravity 1.025, Urine Protein Negative, Urine Glucose (UA) Negative, 

Urine Ketones Negative, Urine Blood Small H, Urine Nitrate Negative, Urine 

Bilirubin Negative, Urine Urobilinogen 0.2, Ur Leukocyte Esterase Small H, 

Urine RBC 2 - 5, Urine WBC 5 - 10, Ur Epithelial Cells 4 - 5, Amorphous 

Sediment Few, Urine Bacteria Many, Urine Other Uyeast











Vital Signs











  Temp Pulse Resp BP Pulse Ox


 


 18 16:00   72   109/67 


 


 18 07:41  98.1 F  60  20  


 


 18 18:01   70   108/72 


 


 18 15:00   70   108/72 


 


 18 09:25   71   120/72 


 


 18 07:42  98.0 F  71  20  120/72 


 


 18 07:27  98.0 F  74  20  98/59 L 


 


 18 23:50  98.0 F  67  20  120/88 


 


 18 21:32   61  18  115/73  96


 


 18 19:11   63  18  111/68  96


 


 18 18:58   68  18  125/69  96


 


 18 16:52   64  18  129/74  96


 


 18 15:05   74  18  132/83  96


 


 18 12:15  98.9 F  61  18  141/82  96


 


 18 09:14  98.9 F  63  18  125/82  97


 


 18 06:09   72  18  130/77  97


 


 18 06:00  97.8 F    











Consultations:: List each consultation separately and include:  1. Reason for 

request.  2. Findings.  3. Follow-up


Consultations: 





medical consult 


cardiology consult 


GI consult





all appreciated





Summary of Hospital Course include:: 1. Description of specific treatment plan 

utilized for patients during their course of treatmen.  2. Summarize the time-

course for resolution of acute symptoms and/or regressed behaviors.  3. 

Describe issues identified and worked on during hospitalization.  4. Describe 

medication utilized.  5. Describe medical problems identified and treated.  6. 

Reassessment of suicide risk


Summary of Hospital Course: 


shortly patient is 52 year old  female, self reported a history 

depression and anxiety, denied previous psychiatric admissions, denied h/o 

suicidal attempts, pt has multiple medical problems including HTN, GI fistula, 

dyslipidemia, strong family h/o mental illness of schizophrenia and bipolar 

spectrum, came to the hospital for abdominal discomfort, ED physician was 

concerned about pt's presentation, pt was anxious, tearful and it was the 

second visit to ED for the past week (18 and 18) for the similar 

symptoms and psychiatric evaluation was called. during the initial evaluation 

at ED by PES pt was found to be very anxious, difficulties to concentrate, 

depressed, tearful, pt mentioned that unintentionally she lost 20 Lb for the 

past two months, was refusing to have medical tests done (colonoscopy), pt had 

scheduled appt with Dr.Paul Tomlinson (local psychiatrist) today 18, but was 

not able to wait till the morning time to see the psychiatrist and requested 

psych admission. 





at the time of admission patient appears to be careless for her appearance, long

/uncombed hair, pulled back to the ponytail, no make up, dark circles under eyes

, tearful, flat and depressed affect, good ADLs. 


pt said that she went through a lot of losses, pt's boyfriend of 10+years  

4years ago, pt still blames herself for not doing more that she did, pt said 

that she lost her mother in  because of lung cancer, father  of heart 

attack in , pt has only one sister who has multiple medical problems. 





Pt said that she was always depressed but now "it is out of control, I am 

crying for small little things, I cannot sleep, maximum I sleep for couple of 

hours", pt said that she is not suicidal or homicidal but her symptoms are 

affecting her functionality, pt said that she is afraid of dying and that is 

why she refused to have colonoscopy "I know it is irrational but I am afraid 

that I will never wake up after anesthesia". pt said that she lost 20+Lb 

unintentionally because she lost her appetite for the past two months. "I used 

to be resilient in crisis situations, but now I feel I not able to handle 

anything"





pt reported that she feels "constant state of nervousness" pt said that she is 

worried about her medical issues "it is out of control, I am cheking and 

rechecking my symptoms, I feel like I have no fistula but I have a cancer, then 

I am worried about my health, about future, about bills, about my sister...". 

pt said that she has episodes when she could feel her chest is tight, 

difficulties to breath, shakines, fear of dying which brought pt to ED on . 





pt said that her GI doctor prescribed zoloft on 1/15/18 after what "I feel worse

, I feel more anxious". 





pt denied manic symptoms, none identified. 





pt denied v/a/t hallucinations, denied paranoid ideation. 





Past psych h/o: GI prescribed zoloft, but pt was more anxious on this medication

,  prescribed paxil and ativan, but pt did not feel comfortable to take 

meds. 





Medical h/o: see above. 





Social h/o: pt lives alone, works, no h/o drugs or alcohol or smoking. 





Family h/o; strong family h/o mental illness, pt has h/o bipolar and ? 

schizophrenia, alcohol addiction, tried to commit suicide. sister is on 

disability due to bipolar disorder, currently on valium, vistaril, zoloft, 

xanax. 





pt made a request she does not want to be on medication which potentially could 

give addiction. 


this writer educated about paxil at night and vistaril for anxiety.


risk/benefits and alternatives discussed. 














 18 09:35 





 18 09:35 





 Lab Results





18 07:45: TSH 3rd Generation 0.85


18 07:45: Fasting Glucose 105, Triglycerides 99, Cholesterol 177, LDL 

Cholesterol Direct 116, HDL Cholesterol 38


18 09:35: Sodium 142, Chloride 104, Potassium 3.7, Carbon Dioxide 26, 

Anion Gap 15, BUN 11, Creatinine 0.6 L, Est GFR ( Amer) > 60, Est GFR (

Non-Af Amer) > 60, Random Glucose 95, Calcium 10.3, Total Bilirubin 0.7, AST 26

, ALT 39, Alkaline Phosphatase 63, Lactate Dehydrogenase 413, Total Creatine 

Kinase 46, Troponin I < 0.01, Total Protein 7.7, Albumin 4.4, Globulin 3.3, 

Albumin/Globulin Ratio 1.3, Lipase 165


18 09:35: pO2 45, VBG pH 7.40, VBG pCO2 46.0, VBG HCO3 28.5 H, VBG Total 

CO2 29.9 H, VBG O2 Sat (Calc) 88.1 H, VBG Base Excess 3.0 H, VBG Potassium 4.1, 

Sodium 140.0, Chloride 103.0, Glucose 95, Lactate 1.2, FiO2 21.0, Venous Blood 

Potassium 4.1


18 09:35: PT 12.4, INR 1.08, APTT 33.8


18 09:35: WBC 9.6, RBC 4.87, Hgb 15.0, Hct 42.9, MCV 88.1, MCH 30.8, MCHC 

35.0, RDW 12.9, Plt Count 290, MPV 10.4, Gran % 67.3, Lymph % (Auto) 25.5, Mono 

% (Auto) 6.2 H, Eos % (Auto) 0.6 L, Baso % (Auto) 0.4, Gran # 6.45, Lymph # 2.4

, Mono # 0.6, Eos # 0.1, Baso # 0.04


18 08:16: Urine Color Yellow, Urine Appearance Clear, Urine pH 6.0, Ur 

Specific Gravity 1.025, Urine Protein Negative, Urine Glucose (UA) Negative, 

Urine Ketones Negative, Urine Blood Small H, Urine Nitrate Negative, Urine 

Bilirubin Negative, Urine Urobilinogen 0.2, Ur Leukocyte Esterase Small H, 

Urine RBC 2 - 5, Urine WBC 5 - 10, Ur Epithelial Cells 4 - 5, Amorphous 

Sediment Few, Urine Bacteria Many, Urine Other Uyeast











Vital Signs











  Temp Pulse Resp BP Pulse Ox


 


 18 09:25   71   120/72 


 


 18 07:42  98.0 F  71  20  120/72 


 


 18 07:27  98.0 F  74  20  98/59 L 


 


 18 23:50  98.0 F  67  20  120/88 


 


 18 21:32   61  18  115/73  96


 


 18 19:11   63  18  111/68  96


 


 18 18:58   68  18  125/69  96


 


 18 16:52   64  18  129/74  96


 


 18 15:05   74  18  132/83  96


 


 18 12:15  98.9 F  61  18  141/82  96


 


 18 09:14  98.9 F  63  18  125/82  97


 


 18 06:09   72  18  130/77  97


 


 18 06:00  97.8 F    








during this short stay in the hospital patient was initiated Paxil which was 

titrated to 10 mg at the nighttime, Zoloft was discontinued, Vistaril was 

started 25 mg 3 times a day as needed for anxiety. Patient tolerated 

medications well, no side effects observed or reported, aims 0, no EPS.





patient improved significantly, anxiety is much better, patient was able to 

sleep, participated in unit activities, was going to groups and had group 

therapy.





Over the course of this hospitalization pt was attending groups, pt also had 

medication management, had therapeutic milieu. 





Overall pt improved significantly, pt's affect became brighter, pt was less 

depressed, has realistic future oriented plans, pt also does not appear to be 

psychotic, or anxious, pt was socially appropriate, no behavioral issues, pts 

insight improved as well and soon pt deemed to be ready for discharge. 





At the time of the discharge pt denied been depressed, denied thoughts of 

harming self or others, denied psychotic symptoms, and pt does not appeared to 

be psychotic, denied been anxious, pt is not in  imminent danger to self or 

others, will be following up at Dr.Paul Tomlinson's office (local psychiatrist)., 

information about follow up appointment, time and address provided to the pt, 

it is patient responsibility to follow up with outpatient clinic, PMD as well 

as specialists (see SW note for more detailed information).


In case pt will need to obtain results of studies pending at discharge pt was 

provided with contact information of Psychiatric Inpatient unit [phone] 

as well as Medical Record Department [phone].


pt was provided with prescriptions for all of medications (please see 

medication reconciliation form)


Pt was educated about safety plan in case of worsening of  symptoms or in case 

of suicidal or homicidal ideation call 911 or go to the nearest ER, also was 

educated to take meds as prescribed and stay away from drugs, pt verbalized 

understanding.

















- Diagnosis


(1) MDD (major depressive disorder)


Status: Acute   Priority: High   





(2) DANILO (generalized anxiety disorder)


Status: Acute   Priority: High   





- Final Diagnosis (DSM 5)


Condition upon Discharge: GOOD


Disposition: HOME/ ROUTINE


Follow-up Treatment Plan: 


At the time of the discharge pt denied been depressed, denied thoughts of 

harming self or others, denied psychotic symptoms, and pt does not appeared to 

be psychotic, denied been anxious, pt is not in  imminent danger to self or 

others, will be following up at Dr.Paul Tomlinson's office (local psychiatrist)., 

information about follow up appointment, time and address provided to the pt, 

it is patient responsibility to follow up with outpatient clinic, PMD as well 

as specialists (see SW note for more detailed information).


In case pt will need to obtain results of studies pending at discharge pt was 

provided with contact information of Psychiatric Inpatient unit [phone] 

as well as Medical Record Department [phone].


pt was provided with prescriptions for all of medications (please see 

medication reconciliation form)


Pt was educated about safety plan in case of worsening of  symptoms or in case 

of suicidal or homicidal ideation call 911 or go to the nearest ER, also was 

educated to take meds as prescribed and stay away from drugs, pt verbalized 

understanding.


Prescriptions/Medication Reconciliation: 


hydrOXYzine Pamoate [Vistaril] 25 mg PO 0900,1800 PRN #30 cap


 PRN Reason: Anxiety


Nitrofurantoin Macrocrystals [Macrobid] 100 mg PO Q12 #10 cap


PARoxetine [Paxil] 10 mg PO HS #14 tab





- Smoking Cessation


Smoking Cessation Medication prescribed: No


Reason for not providing: pt denies smoking

## 2018-11-16 ENCOUNTER — HOSPITAL ENCOUNTER (EMERGENCY)
Dept: HOSPITAL 42 - ED | Age: 53
Discharge: HOME | End: 2018-11-16
Payer: COMMERCIAL

## 2018-11-16 VITALS — RESPIRATION RATE: 18 BRPM | TEMPERATURE: 98.7 F

## 2018-11-16 VITALS — OXYGEN SATURATION: 96 % | DIASTOLIC BLOOD PRESSURE: 69 MMHG | SYSTOLIC BLOOD PRESSURE: 122 MMHG | HEART RATE: 81 BPM

## 2018-11-16 VITALS — BODY MASS INDEX: 36 KG/M2
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
## 2018-11-16 DIAGNOSIS — S53.105A: Primary | ICD-10-CM

## 2018-11-16 PROCEDURE — 24600 TX CLSD ELBOW DISLC W/O ANES: CPT

## 2018-11-16 PROCEDURE — 73090 X-RAY EXAM OF FOREARM: CPT

## 2018-11-16 PROCEDURE — 73080 X-RAY EXAM OF ELBOW: CPT

## 2018-11-16 PROCEDURE — 96372 THER/PROPH/DIAG INJ SC/IM: CPT

## 2018-11-16 PROCEDURE — 99285 EMERGENCY DEPT VISIT HI MDM: CPT

## 2018-11-16 PROCEDURE — 73060 X-RAY EXAM OF HUMERUS: CPT

## 2018-11-16 PROCEDURE — 70450 CT HEAD/BRAIN W/O DYE: CPT

## 2018-11-16 NOTE — ED PDOC
Arrival/HPI





- General


Historian: Patient





- History of Present Illness


Narrative History of Present Illness (Text): 





11/16/18 18:55


52 year old female with a past medical history of hypertension and 

hyperlipidemia who presents to the emergency department after having a 

mechanical fall prior to arrival. Patient states she was bringing her laundry in

her roller when she tripped and fell onto the roller.  The patient denies any 

lightheadedness, dizziness, chest pain, or palpitations prior to the fall. 

Patient denies any fevers, chills, nausea, vomiting, headache, or any other 

complaints.





PMD: Dr. Garcia





Medical history: hypertension, hyperlipidemia


Medications: Losartan/hctz, Aspirin, metoprolol, Simvistatin, Paxil


Surgical history: Denies


Allergies: Denies


Social history: Denies tobacco or alcohol use. Denies illicit drug use.


Time/Duration: Prior to Arrival


Symptom Onset: Sudden


Symptom Course: Unchanged


Quality: Throbbing


Severity Level: 1


Activities at Onset: Rest


Context: Sitting





<Sebastián Thomas - Last Filed: 11/16/18 20:59>





<Madi Cevallos DO - Last Filed: 11/17/18 15:56>





- General


Chief Complaint: Trauma


Time Seen by Provider: 11/16/18 18:12





Past Medical History





- Provider Review


Nursing Documentation Reviewed: Yes





- Infectious Disease


Hx of Infectious Diseases: None





- Reproductive


Menopause: Yes





- Cardiac


Hx Cardiac Disorders: Yes (hyperlipidemia)


Hx Hypertension: Yes





- Pulmonary


Hx Respiratory Disorders: No





- Neurological


Hx Neurological Disorder: No





- HEENT


Hx HEENT Disorder: No





- Renal


Hx Renal Disorder: No





- Endocrine/Metabolic


Hx Endocrine Disorders: No





- Hematological/Oncological


Hx Blood Disorders: No





- Integumentary


Hx Dermatological Disorder: No





- Musculoskeletal/Rheumatological


Hx Musculoskeletal Disorders: No


Hx Arthritis:  (joint pains)


Hx Falls: No





- Gastrointestinal


Hx Gall Bladder Disease: Yes (cholelitiasis)





- Genitourinary/Gynecological


Hx Genitourinary Disorders: No





- Psychiatric


Hx Psychophysiologic Disorder: Yes


Hx Anxiety: Yes


Hx Depression: Yes


Hx Substance Use: No





- Past Surgical History


Past Surgical History: No Previous





- Anesthesia


Hx Anesthesia: No





- Suicidal Assessment


Feels Threatened In Home Enviroment: No





<Sebastián Thomas - Last Filed: 11/16/18 20:59>





Family/Social History





- Physician Review


Nursing Documentation Reviewed: Yes


Family/Social History: No Known Family HX


Smoking Status: Never Smoked


Hx Alcohol Use: No


Hx Substance Use: No





<Sebastián Thomas - Last Filed: 11/16/18 20:59>





Allergies/Home Meds





<MarthaSebastián ash - Last Filed: 11/16/18 20:59>





<Mart Madi - Last Filed: 11/17/18 15:56>


Allergies/Adverse Reactions: 


Allergies





No Known Allergies Allergy (Verified 01/24/18 00:00)


   








Home Medications: 


                                    Home Meds











 Medication  Instructions  Recorded  Confirmed


 


Losartan/Hydrochlorothiazide 1 tab PO DAILY 04/22/16 11/16/18





[Hyzaar 100-25 Tablet]   














Review of Systems





- Physician Review


All systems were reviewed & negative as marked: Yes





- Review of Systems


Constitutional: Normal.  absent: Fatigue, Weight Change


Eyes: Normal.  absent: Vision Changes, Photophobia


ENT: Normal.  absent: Hearing Changes, Rhinorrhea


Respiratory: Normal.  absent: SOB, Cough


Cardiovascular: Normal.  absent: Chest Pain, Syncope


Gastrointestinal: Normal.  absent: Abdominal Pain, Vomiting


Musculoskeletal: Other (Left arm and elbow pain.).  absent: Arthralgias, Back 

Pain


Skin: Normal.  absent: Rash, Pruritis


Neurological: Normal.  absent: Headache, Dizziness


Endocrine: Normal.  absent: Diaphoresis, Polyuria


Hemo/Lymphatic: Normal.  absent: Adenopathy, Easy Bleeding


Psychiatric: Normal.  absent: Anxiety, Depression





<Sebastián Thomas - Last Filed: 11/16/18 20:59>





Physical Exam


Vital Signs Reviewed: Yes





Vital Signs











  Temp Pulse Resp BP Pulse Ox


 


 11/16/18 18:02  98.9 F  80  18  142/85  97











Temperature: Afebrile


Blood Pressure: Normal


Pulse: Regular


Respiratory Rate: Normal


Appearance: Positive for: Well-Appearing, Non-Toxic, Comfortable


Pain Distress: None


Mental Status: Positive for: Alert and Oriented X 3.  No: Confused





- Systems Exam


Head: Present: Atraumatic, Normocephalic.  No: Abrasion


Pupils: Present: PERRL.  No: Sluggish


Extroacular Muscles: Present: EOMI.  No: Gaze Palsy


Conjunctiva: Present: Normal.  No: Injected


Mouth: Present: Moist Mucous Membranes.  No: Dry, Normal Teeth


Neck: Present: Normal Range of Motion.  No: Meningeal Signs, JVD


Respiratory/Chest: Present: Clear to Auscultation, Good Air Exchange.  No: 

Wheezes


Cardiovascular: Present: Regular Rate and Rhythm, Normal S1, S2.  No: 

Tachycardic


Abdomen: Present: Normal Bowel Sounds.  No: Tenderness, Distention, Guarding, 

McBurney's Point Tender


Upper Extremity: Present: Normal Inspection.  No: Cyanosis, Edema


Lower Extremity: Present: Normal Inspection.  No: Edema, Normal ROM


Neurological: Present: CN II-XII Intact, Speech Normal


Skin: Present: Warm, Dry, Normal Color.  No: Rashes, Cold


Psychiatric: Present: Alert, Oriented x 3, Normal Insight





<Sebastián Thomas - Last Filed: 11/16/18 20:59>





Vital Signs











  Temp Pulse Resp BP Pulse Ox


 


 11/16/18 18:02  98.9 F  80  18  142/85  97














<Madi Cevallos DO - Last Filed: 11/17/18 15:56>





Medical Decision Making


ED Course and Treatment: 





11/16/18 19:01


52 year old female with a past medical history of hypertension and 

hyperlipidemia who presents to the emergency department after having a 

mechanical fall prior to arrival.





Mechanical Fall 





Plan:


Left elbow xray


Left forearm and upper arm xray


Head CT


11/16/18 20:50








Elbow reduced. Confirmed with bedside Xray.


Patient placed in posterior arm splint.


Patient placed in sling.





Referral and follow up to Orthopedics given.








- RAD Interpretation


Radiology Orders: 











11/16/18 18:42


ELBOW LEFT 3 VIEWS ROUTINE [RAD] Stat 


FOREARM LEFT [RAD] Stat 


HUMERUS LEFT [RAD] Stat 














- Medication Orders


Current Medication Orders: 











Ketorolac Tromethamine (Toradol)  60 mg IM STAT STA


   Stop: 11/16/18 18:43











<Sebastián Thomas - Last Filed: 11/16/18 20:59>


ED Course and Treatment: 


11/16/18 19:18


Colton Meadows is a 52 year old female who presents to the emergency department 

for further evaluation s/p mechanical fall today. In agreement with resident 

note, which includes further HPI details. Patient was seen and evaluated with 

resident, came up with plan and treatment together. 





11/16/18 20:54


PROCEDURE: PROCEDURAL SEDATION


Performed by the emergency provider


Start Time: 2015


Consent: Informed consent, after discussion of the risks, benefits, and 

alternatives to the procedure,


was obtained.


Timeout: A timeout to verify the correct patient, procedure, and site was 

performed immediately


prior to the procedure.


Indication: ***


Mallampati Classification: 1


Last Meal: The patient ate 6 hours ago.


Patient History:


History of adverse reactions involving sedation/anesthesia: ***No patient or fam

mikey history of


adverse reaction


Patients H & P remains current: {YES}


History and Physical and Current Medication list reviewed: {YES      


Appropriate Candidate: Based on history and airway assessment, patient is an 

appropriate


candidate for Moderate / Procedural Sedation: {YES}


Preparation: Cardiac monitoring and continuous pulse oximetry. IV access 

obtained. Suction


immediately available at bedside.


Patient Position: supine


Anesthesia: Patient was given appropriate sedation. See MAR for details.


Post-procedure: Patient tolerated the procedure well with no immediate 

complications. Patient


recovered from sedation uneventfully and {did not} require airway intervention. 

Post anesthesia the


patient's vital signs including respiratory function, cardiovascular function, 

and temperature were


{stable}. The patient's pain post anesthesia was assessed as {mild}.


The patient was assessed for nausea post-sedation and the patient {denies} it. 

At discharge


patient back to baseline mental status and able to tolerate PO fluids in 

Emergency Department


End Time: 2040 11/17/18 15:52





11/17/18 15:54








- RAD Interpretation


Radiology Orders: 











11/16/18 18:42


ELBOW LEFT 3 VIEWS ROUTINE [RAD] Stat 


FOREARM LEFT [RAD] Stat 


HUMERUS LEFT [RAD] Stat 





11/16/18 19:03


HEAD W/O CONTRAST [CT] Stat 














- Medication Orders


Current Medication Orders: 














Discontinued Medications





Ketorolac Tromethamine (Toradol)  60 mg IM STAT STA


   Stop: 11/16/18 18:43


   Last Admin: 11/16/18 19:08  Dose: 60 mg





MAR Pain Assessment


 Document     11/16/18 19:08  SRE  (Rec: 11/16/18 19:09  SRE  [id number])


     Pain Reassessment


      Is this a pain reassessment?               Yes


     Sleep


      Is patient sleeping during reassessment?   No


     Presence of Pain


      Presence of Pain                           Yes


     Pain Scale Used


     Protocol:  PSCALES


      Pain Scale Used                            Numeric


     Location


      Left, Right or Bilateral                   Left


      Pain Location Body Site                    Wrist


                                                 Generalized


     Description


      Description                                Constant


IM Administration Charges


 Document     11/16/18 19:08  SRE  (Rec: 11/16/18 19:09  SRE  [id number])


     Charges for Administration


      # of IM Administrations                    1














<Madi Cevallos DO - Last Filed: 11/17/18 15:56>





Disposition/Present on Arrival





- Present on Arrival


Any Indicators Present on Arrival: No


History of DVT/PE: No


History of Uncontrolled Diabetes: No


Urinary Catheter: No


History of Decub. Ulcer: No


History Surgical Site Infection Following: None





- Disposition


Have Diagnosis and Disposition been Completed?: Yes


Disposition Time: 20:53


Patient Plan: Discharge





<Pastora Thomasn - Last Filed: 11/16/18 20:59>





- Disposition


Disposition Time: 20:50





<Madi Cevallos DO - Last Filed: 11/17/18 15:56>





- Disposition


Diagnosis: 


 Elbow dislocation





Disposition: HOME/ ROUTINE


Condition: IMPROVED


Discharge Instructions (ExitCare):  [facility], Elbow Dislocation


Additional Instructions: 





COLTON MEADOWS, thank you for letting us take care of you today. The emergency 

medical care you received today was directed at your acute symptoms. If you were

 prescribed any medication, please fill it and take as directed. It may take 

several days for your symptoms to resolve. Return to the Emergency Department if

 your symptoms worsen, do not improve, or if you have any other problems.





Please contact your doctor or call one of the physicians/clinics you have been 

referred to that are listed on the Patient Visit Information form that is 

included in your discharge packet. Bring any paperwork you were given at 

discharge with you along with any medications you are taking to your follow up 

visit. Our treatment cannot replace ongoing medical care by a primary care 

provider outside of the emergency department.





Thank you for allowing the [facility] team to be part of your care today.














DO NOT TAKE THE SPLINT OFF UNTIL SEEN BY THE ORTHOPEDIC DOCTOR.








DO NOT GET IT WET.








Followup with Dr. Baugh, the orthopedic doctor, in 2-3 days for re-evaluation 

and further management.


Prescriptions: 


Ibuprofen [Motrin] 600 mg PO Q6 PRN #20 tab


 PRN Reason: Pain, Moderate (4-7)


Referrals: 


Kaden Baugh MD [Staff Provider] - Follow up with primary


Forms:  [employer] (English)

## 2018-11-17 NOTE — RAD
PROCEDURE:  Radiographs of the left elbow.



HISTORY:

 s/p mechanical fall 



COMPARISON:

No prior.



FINDINGS:

Dislocation of the elbow joint with marked soft tissue swelling and 

prominent posterior and anterior joint effusions.  No acute displaced 

fracture identified. 



IMPRESSION:

Dislocation of the elbow joint with marked soft tissue swelling and 

prominent posterior and anterior joint effusions. No acute displaced 

fracture identified.

## 2018-11-17 NOTE — RAD
PROCEDURE:  Radiographs of the left humerus.



HISTORY:

s/p mechanical fall



COMPARISON:

None available.



FINDINGS:



BONES:

No acute displaced fracture identified.  Limited evaluation of 

dislocated elbow. 



SOFT TISSUES:

Soft tissue swelling.  No evidence of radiopaque foreign body.



OTHER FINDINGS:

None.



IMPRESSION:

No acute displaced fracture of the humerus.  Dislocation of the left 

elbow.

## 2018-11-17 NOTE — RAD
PROCEDURE:  Radiographs of the left elbow.



HISTORY:

 s/p reduction 



COMPARISON:

Left elbow radiograph performed 11/16/18 at 1933 hr



FINDINGS:

2 lateral images were provided.  1st imaged demonstrates dislocation 

status post unsuccessful attempt at reduction.  Second image 

demonstrates apparent successful reduction however in the absence of 

orthogonal views, evaluation is limited.  Soft tissue swelling.  No 

acute displaced fracture identified. 



IMPRESSION:

2 lateral images were provided. 1st imaged demonstrates dislocation 

status post unsuccessful attempt at reduction. Second image 

demonstrates apparent successful reduction however in the absence of 

orthogonal views, evaluation is limited.  Soft tissue swelling. No 

acute displaced fracture identified.

## 2018-11-17 NOTE — CT
Date of service: 11/16/2018



PROCEDURE:  CT HEAD WITHOUT CONTRAST.



HISTORY:

s/p mechanical fall



COMPARISON:

Noncontrast head CT 1/18/18



TECHNIQUE:

Axial computed tomography images were obtained through the head/brain 

without intravenous contrast.  



Radiation dose:



Total exam DLP = 1148.65 mGy-cm.



This CT exam was performed using one or more of the following dose 

reduction techniques: Automated exposure control, adjustment of the 

mA and/or kV according to patient size, and/or use of iterative 

reconstruction technique.



FINDINGS:



HEMORRHAGE:

No intracranial hemorrhage. 



BRAIN:

No mass effect or edema. The gray-white matter differentiation 

appears intact. Please note that MRI with diffusion imaging is more 

sensitive in the detection of acute ischemic event.



VENTRICLES:

No hydrocephalus. 



CALVARIUM:

Unremarkable.



PARANASAL SINUSES:

Mild mucosal thickening the air cells and left maxillary sinus. 



MASTOID AIR CELLS:

Unremarkable as visualized. No inflammatory changes.



OTHER FINDINGS:

None.



IMPRESSION:

No acute intracranial pathology identified. 



Preliminary impression was provided by [facility].

## 2018-11-17 NOTE — RAD
PROCEDURE:  Radiographs of the left elbow.



HISTORY:

 s/p mechanical fall 



COMPARISON:

No prior.



FINDINGS:



BONES:

No acute displaced fracture.



JOINTS:

Dislocation of the elbow.



SOFT TISSUES:

Soft tissue swelling.  No evidence of radiopaque foreign body.



JOINT EFFUSION:

Joint effusion consistent with dislocation.



OTHER FINDINGS:

None



IMPRESSION:

No acute displaced fracture identified.  Elbow dislocation and 

effusion.  Soft tissue swelling.

## 2022-12-20 NOTE — RAD
HISTORY:

ap  



COMPARISON:

10/05/2017 



FINDINGS:



LUNGS:

No active pulmonary disease.



PLEURA:

No significant pleural effusion identified, no pneumothorax apparent.



CARDIOVASCULAR:

Normal.



OSSEOUS STRUCTURES:

No significant abnormalities.



VISUALIZED UPPER ABDOMEN:

Normal.



OTHER FINDINGS:

None.



IMPRESSION:

No active disease. Bilobed Flap Text: The defect edges were debeveled with a #15 scalpel blade.  Given the location of the defect and the proximity to free margins a bilobe flap was deemed most appropriate.  Using a sterile surgical marker, an appropriate bilobe flap drawn around the defect.    The area thus outlined was incised deep to adipose tissue with a #15 scalpel blade.  The skin margins were undermined to an appropriate distance in all directions utilizing iris scissors.